# Patient Record
Sex: FEMALE | Race: WHITE | NOT HISPANIC OR LATINO | Employment: OTHER | URBAN - METROPOLITAN AREA
[De-identification: names, ages, dates, MRNs, and addresses within clinical notes are randomized per-mention and may not be internally consistent; named-entity substitution may affect disease eponyms.]

---

## 2019-09-12 DIAGNOSIS — Z12.39 BREAST SCREENING: Primary | ICD-10-CM

## 2019-09-17 ENCOUNTER — HOSPITAL ENCOUNTER (OUTPATIENT)
Dept: RADIOLOGY | Facility: HOSPITAL | Age: 64
Discharge: HOME | End: 2019-09-17
Attending: FAMILY MEDICINE
Payer: MEDICARE

## 2019-09-17 DIAGNOSIS — Z12.39 BREAST SCREENING: ICD-10-CM

## 2019-09-17 PROCEDURE — 77063 MAMMO DIGITAL SCREENING BILAT WITH TOMOSYNTHESIS_CAD: ICD-10-PCS | Mod: 26,,, | Performed by: RADIOLOGY

## 2019-09-17 PROCEDURE — 77067 SCR MAMMO BI INCL CAD: CPT | Mod: TC

## 2019-09-17 PROCEDURE — 77067 MAMMO DIGITAL SCREENING BILAT WITH TOMOSYNTHESIS_CAD: ICD-10-PCS | Mod: 26,,, | Performed by: RADIOLOGY

## 2019-09-17 PROCEDURE — 77063 BREAST TOMOSYNTHESIS BI: CPT | Mod: 26,,, | Performed by: RADIOLOGY

## 2019-09-17 PROCEDURE — 77067 SCR MAMMO BI INCL CAD: CPT | Mod: 26,,, | Performed by: RADIOLOGY

## 2019-09-20 ENCOUNTER — TELEPHONE (OUTPATIENT)
Dept: RADIOLOGY | Facility: HOSPITAL | Age: 64
End: 2019-09-20

## 2020-09-13 PROBLEM — E55.9 VITAMIN D DEFICIENCY: Chronic | Status: ACTIVE | Noted: 2020-09-13

## 2020-09-13 PROBLEM — K63.5 COLON POLYPS: Chronic | Status: ACTIVE | Noted: 2020-09-13

## 2020-09-13 PROBLEM — E21.3 HYPERPARATHYROIDISM: Chronic | Status: ACTIVE | Noted: 2020-09-13

## 2020-09-13 PROBLEM — M81.0 AGE-RELATED OSTEOPOROSIS WITHOUT CURRENT PATHOLOGICAL FRACTURE: Chronic | Status: ACTIVE | Noted: 2020-09-13

## 2020-09-13 PROBLEM — E78.00 HIGH CHOLESTEROL: Chronic | Status: ACTIVE | Noted: 2020-09-13

## 2020-09-13 PROBLEM — R92.8 ABNORMAL MAMMOGRAM: Status: ACTIVE | Noted: 2020-09-13

## 2020-09-21 ENCOUNTER — HOSPITAL ENCOUNTER (OUTPATIENT)
Dept: RADIOLOGY | Facility: HOSPITAL | Age: 65
Discharge: HOME | End: 2020-09-21
Attending: FAMILY MEDICINE
Payer: MEDICARE

## 2020-09-21 DIAGNOSIS — Z12.31 ENCOUNTER FOR SCREENING MAMMOGRAM FOR BREAST CANCER: ICD-10-CM

## 2020-09-21 PROCEDURE — 77063 MAMMO DIGITAL SCREENING BILAT WITH TOMO: ICD-10-PCS | Mod: 26,,, | Performed by: RADIOLOGY

## 2020-09-21 PROCEDURE — 77067 MAMMO DIGITAL SCREENING BILAT WITH TOMO: ICD-10-PCS | Mod: 26,,, | Performed by: RADIOLOGY

## 2020-09-21 PROCEDURE — 77063 BREAST TOMOSYNTHESIS BI: CPT | Mod: 26,,, | Performed by: RADIOLOGY

## 2020-09-21 PROCEDURE — 77067 SCR MAMMO BI INCL CAD: CPT | Mod: TC

## 2020-09-21 PROCEDURE — 77067 SCR MAMMO BI INCL CAD: CPT | Mod: 26,,, | Performed by: RADIOLOGY

## 2021-06-19 ENCOUNTER — HOSPITAL ENCOUNTER (EMERGENCY)
Facility: HOSPITAL | Age: 66
Discharge: HOME | End: 2021-06-19
Attending: EMERGENCY MEDICINE
Payer: MEDICARE

## 2021-06-19 VITALS
TEMPERATURE: 98 F | HEIGHT: 67 IN | DIASTOLIC BLOOD PRESSURE: 72 MMHG | WEIGHT: 218 LBS | SYSTOLIC BLOOD PRESSURE: 120 MMHG | OXYGEN SATURATION: 100 % | RESPIRATION RATE: 16 BRPM | HEART RATE: 93 BPM | BODY MASS INDEX: 34.21 KG/M2

## 2021-06-19 DIAGNOSIS — K46.9 HERNIA OF ABDOMINAL CAVITY: ICD-10-CM

## 2021-06-19 DIAGNOSIS — R10.31 RIGHT LOWER QUADRANT ABDOMINAL PAIN: Primary | ICD-10-CM

## 2021-06-19 DIAGNOSIS — R10.9 ABDOMINAL PAIN: ICD-10-CM

## 2021-06-19 LAB
ALBUMIN SERPL BCP-MCNC: 4.1 G/DL (ref 3.5–5.2)
ALP SERPL-CCNC: 58 U/L (ref 55–135)
ALT SERPL W/O P-5'-P-CCNC: 12 U/L (ref 10–44)
ANION GAP SERPL CALC-SCNC: 14 MMOL/L (ref 8–16)
AST SERPL-CCNC: 16 U/L (ref 10–40)
BASOPHILS # BLD AUTO: 0.01 K/UL (ref 0–0.2)
BASOPHILS NFR BLD: 0.1 % (ref 0–1.9)
BILIRUB SERPL-MCNC: 0.7 MG/DL (ref 0.1–1)
BUN SERPL-MCNC: 17 MG/DL (ref 8–23)
CALCIUM SERPL-MCNC: 10.6 MG/DL (ref 8.7–10.5)
CHLORIDE SERPL-SCNC: 107 MMOL/L (ref 95–110)
CO2 SERPL-SCNC: 18 MMOL/L (ref 23–29)
CREAT SERPL-MCNC: 0.7 MG/DL (ref 0.5–1.4)
DIFFERENTIAL METHOD: ABNORMAL
EOSINOPHIL # BLD AUTO: 0 K/UL (ref 0–0.5)
EOSINOPHIL NFR BLD: 0.1 % (ref 0–8)
ERYTHROCYTE [DISTWIDTH] IN BLOOD BY AUTOMATED COUNT: 12.9 % (ref 11.5–14.5)
EST. GFR  (AFRICAN AMERICAN): >60 ML/MIN/1.73 M^2
EST. GFR  (NON AFRICAN AMERICAN): >60 ML/MIN/1.73 M^2
GLUCOSE SERPL-MCNC: 123 MG/DL (ref 70–110)
HCT VFR BLD AUTO: 40.9 % (ref 37–48.5)
HGB BLD-MCNC: 13.6 G/DL (ref 12–16)
IMM GRANULOCYTES # BLD AUTO: 0.02 K/UL (ref 0–0.04)
IMM GRANULOCYTES NFR BLD AUTO: 0.3 % (ref 0–0.5)
LYMPHOCYTES # BLD AUTO: 1.1 K/UL (ref 1–4.8)
LYMPHOCYTES NFR BLD: 14.1 % (ref 18–48)
MCH RBC QN AUTO: 30.2 PG (ref 27–31)
MCHC RBC AUTO-ENTMCNC: 33.3 G/DL (ref 32–36)
MCV RBC AUTO: 91 FL (ref 82–98)
MONOCYTES # BLD AUTO: 0.5 K/UL (ref 0.3–1)
MONOCYTES NFR BLD: 6.5 % (ref 4–15)
NEUTROPHILS # BLD AUTO: 6.1 K/UL (ref 1.8–7.7)
NEUTROPHILS NFR BLD: 78.9 % (ref 38–73)
NRBC BLD-RTO: 0 /100 WBC
PLATELET # BLD AUTO: 222 K/UL (ref 150–450)
PMV BLD AUTO: 9.6 FL (ref 9.2–12.9)
POTASSIUM SERPL-SCNC: 3.9 MMOL/L (ref 3.5–5.1)
PROT SERPL-MCNC: 7.5 G/DL (ref 6–8.4)
RBC # BLD AUTO: 4.51 M/UL (ref 4–5.4)
SODIUM SERPL-SCNC: 139 MMOL/L (ref 136–145)
WBC # BLD AUTO: 7.73 K/UL (ref 3.9–12.7)

## 2021-06-19 PROCEDURE — 85025 COMPLETE CBC W/AUTO DIFF WBC: CPT | Performed by: EMERGENCY MEDICINE

## 2021-06-19 PROCEDURE — 96374 THER/PROPH/DIAG INJ IV PUSH: CPT

## 2021-06-19 PROCEDURE — 63600175 PHARM REV CODE 636 W HCPCS: Performed by: EMERGENCY MEDICINE

## 2021-06-19 PROCEDURE — 99284 EMERGENCY DEPT VISIT MOD MDM: CPT | Mod: 25

## 2021-06-19 PROCEDURE — 25000003 PHARM REV CODE 250: Performed by: EMERGENCY MEDICINE

## 2021-06-19 PROCEDURE — 80053 COMPREHEN METABOLIC PANEL: CPT | Performed by: EMERGENCY MEDICINE

## 2021-06-19 RX ORDER — METOCLOPRAMIDE 10 MG/1
10 TABLET ORAL EVERY 6 HOURS PRN
Qty: 14 TABLET | Refills: 0 | Status: SHIPPED | OUTPATIENT
Start: 2021-06-19 | End: 2023-09-14

## 2021-06-19 RX ORDER — DICYCLOMINE HYDROCHLORIDE 20 MG/1
20 TABLET ORAL 3 TIMES DAILY PRN
Qty: 14 TABLET | Refills: 0 | Status: SHIPPED | OUTPATIENT
Start: 2021-06-19 | End: 2021-07-19

## 2021-06-19 RX ORDER — METOCLOPRAMIDE HYDROCHLORIDE 5 MG/ML
10 INJECTION INTRAMUSCULAR; INTRAVENOUS
Status: COMPLETED | OUTPATIENT
Start: 2021-06-19 | End: 2021-06-19

## 2021-06-19 RX ADMIN — SODIUM CHLORIDE 1000 ML: 0.9 INJECTION, SOLUTION INTRAVENOUS at 02:06

## 2021-06-19 RX ADMIN — METOCLOPRAMIDE 10 MG: 5 INJECTION, SOLUTION INTRAMUSCULAR; INTRAVENOUS at 02:06

## 2021-06-21 ENCOUNTER — TELEPHONE (OUTPATIENT)
Dept: ADMINISTRATIVE | Facility: OTHER | Age: 66
End: 2021-06-21

## 2021-06-22 ENCOUNTER — TELEPHONE (OUTPATIENT)
Dept: ADMINISTRATIVE | Facility: OTHER | Age: 66
End: 2021-06-22

## 2021-07-01 ENCOUNTER — OFFICE VISIT (OUTPATIENT)
Dept: SURGERY | Facility: CLINIC | Age: 66
End: 2021-07-01
Payer: MEDICARE

## 2021-07-01 VITALS
HEART RATE: 82 BPM | HEIGHT: 67 IN | BODY MASS INDEX: 35.12 KG/M2 | WEIGHT: 223.75 LBS | SYSTOLIC BLOOD PRESSURE: 130 MMHG | DIASTOLIC BLOOD PRESSURE: 78 MMHG

## 2021-07-01 DIAGNOSIS — K40.90 INGUINAL HERNIA OF RIGHT SIDE WITHOUT OBSTRUCTION OR GANGRENE: Primary | ICD-10-CM

## 2021-07-01 DIAGNOSIS — K46.9 HERNIA OF ABDOMINAL CAVITY: ICD-10-CM

## 2021-07-01 PROCEDURE — 99999 PR PBB SHADOW E&M-EST. PATIENT-LVL V: CPT | Mod: PBBFAC,,, | Performed by: STUDENT IN AN ORGANIZED HEALTH CARE EDUCATION/TRAINING PROGRAM

## 2021-07-01 PROCEDURE — 99204 PR OFFICE/OUTPT VISIT, NEW, LEVL IV, 45-59 MIN: ICD-10-PCS | Mod: S$GLB,,, | Performed by: STUDENT IN AN ORGANIZED HEALTH CARE EDUCATION/TRAINING PROGRAM

## 2021-07-01 PROCEDURE — 3288F PR FALLS RISK ASSESSMENT DOCUMENTED: ICD-10-PCS | Mod: CPTII,S$GLB,, | Performed by: STUDENT IN AN ORGANIZED HEALTH CARE EDUCATION/TRAINING PROGRAM

## 2021-07-01 PROCEDURE — 1126F PR PAIN SEVERITY QUANTIFIED, NO PAIN PRESENT: ICD-10-PCS | Mod: S$GLB,,, | Performed by: STUDENT IN AN ORGANIZED HEALTH CARE EDUCATION/TRAINING PROGRAM

## 2021-07-01 PROCEDURE — 3288F FALL RISK ASSESSMENT DOCD: CPT | Mod: CPTII,S$GLB,, | Performed by: STUDENT IN AN ORGANIZED HEALTH CARE EDUCATION/TRAINING PROGRAM

## 2021-07-01 PROCEDURE — 99204 OFFICE O/P NEW MOD 45 MIN: CPT | Mod: S$GLB,,, | Performed by: STUDENT IN AN ORGANIZED HEALTH CARE EDUCATION/TRAINING PROGRAM

## 2021-07-01 PROCEDURE — 99999 PR PBB SHADOW E&M-EST. PATIENT-LVL V: ICD-10-PCS | Mod: PBBFAC,,, | Performed by: STUDENT IN AN ORGANIZED HEALTH CARE EDUCATION/TRAINING PROGRAM

## 2021-07-01 PROCEDURE — 3008F PR BODY MASS INDEX (BMI) DOCUMENTED: ICD-10-PCS | Mod: CPTII,S$GLB,, | Performed by: STUDENT IN AN ORGANIZED HEALTH CARE EDUCATION/TRAINING PROGRAM

## 2021-07-01 PROCEDURE — 1101F PR PT FALLS ASSESS DOC 0-1 FALLS W/OUT INJ PAST YR: ICD-10-PCS | Mod: CPTII,S$GLB,, | Performed by: STUDENT IN AN ORGANIZED HEALTH CARE EDUCATION/TRAINING PROGRAM

## 2021-07-01 PROCEDURE — 1101F PT FALLS ASSESS-DOCD LE1/YR: CPT | Mod: CPTII,S$GLB,, | Performed by: STUDENT IN AN ORGANIZED HEALTH CARE EDUCATION/TRAINING PROGRAM

## 2021-07-01 PROCEDURE — 1159F MED LIST DOCD IN RCRD: CPT | Mod: S$GLB,,, | Performed by: STUDENT IN AN ORGANIZED HEALTH CARE EDUCATION/TRAINING PROGRAM

## 2021-07-01 PROCEDURE — 3008F BODY MASS INDEX DOCD: CPT | Mod: CPTII,S$GLB,, | Performed by: STUDENT IN AN ORGANIZED HEALTH CARE EDUCATION/TRAINING PROGRAM

## 2021-07-01 PROCEDURE — 1159F PR MEDICATION LIST DOCUMENTED IN MEDICAL RECORD: ICD-10-PCS | Mod: S$GLB,,, | Performed by: STUDENT IN AN ORGANIZED HEALTH CARE EDUCATION/TRAINING PROGRAM

## 2021-07-01 PROCEDURE — 1126F AMNT PAIN NOTED NONE PRSNT: CPT | Mod: S$GLB,,, | Performed by: STUDENT IN AN ORGANIZED HEALTH CARE EDUCATION/TRAINING PROGRAM

## 2021-07-01 RX ORDER — LISINOPRIL 10 MG/1
40 TABLET ORAL EVERY MORNING
COMMUNITY
Start: 2021-06-22

## 2021-07-07 ENCOUNTER — ANESTHESIA EVENT (OUTPATIENT)
Dept: SURGERY | Facility: HOSPITAL | Age: 66
End: 2021-07-07
Payer: MEDICARE

## 2021-07-08 ENCOUNTER — HOSPITAL ENCOUNTER (OUTPATIENT)
Facility: HOSPITAL | Age: 66
Discharge: HOME | End: 2021-07-08
Attending: STUDENT IN AN ORGANIZED HEALTH CARE EDUCATION/TRAINING PROGRAM | Admitting: STUDENT IN AN ORGANIZED HEALTH CARE EDUCATION/TRAINING PROGRAM
Payer: MEDICARE

## 2021-07-08 ENCOUNTER — ANESTHESIA (OUTPATIENT)
Dept: SURGERY | Facility: HOSPITAL | Age: 66
End: 2021-07-08
Payer: MEDICARE

## 2021-07-08 VITALS
RESPIRATION RATE: 18 BRPM | TEMPERATURE: 97 F | BODY MASS INDEX: 35 KG/M2 | OXYGEN SATURATION: 97 % | DIASTOLIC BLOOD PRESSURE: 59 MMHG | HEIGHT: 67 IN | HEART RATE: 69 BPM | SYSTOLIC BLOOD PRESSURE: 118 MMHG | WEIGHT: 223 LBS

## 2021-07-08 DIAGNOSIS — K40.90 INGUINAL HERNIA OF RIGHT SIDE WITHOUT OBSTRUCTION OR GANGRENE: ICD-10-CM

## 2021-07-08 DIAGNOSIS — I10 HYPERTENSION: ICD-10-CM

## 2021-07-08 DIAGNOSIS — K40.20 NON-RECURRENT BILATERAL INGUINAL HERNIA WITHOUT OBSTRUCTION OR GANGRENE: Primary | ICD-10-CM

## 2021-07-08 PROCEDURE — 49650 LAP ING HERNIA REPAIR INIT: CPT | Mod: 50,,, | Performed by: STUDENT IN AN ORGANIZED HEALTH CARE EDUCATION/TRAINING PROGRAM

## 2021-07-08 PROCEDURE — 36000710: Performed by: STUDENT IN AN ORGANIZED HEALTH CARE EDUCATION/TRAINING PROGRAM

## 2021-07-08 PROCEDURE — 36000711: Performed by: STUDENT IN AN ORGANIZED HEALTH CARE EDUCATION/TRAINING PROGRAM

## 2021-07-08 PROCEDURE — 71000033 HC RECOVERY, INTIAL HOUR: Performed by: STUDENT IN AN ORGANIZED HEALTH CARE EDUCATION/TRAINING PROGRAM

## 2021-07-08 PROCEDURE — 63600175 PHARM REV CODE 636 W HCPCS: Performed by: NURSE ANESTHETIST, CERTIFIED REGISTERED

## 2021-07-08 PROCEDURE — 37000008 HC ANESTHESIA 1ST 15 MINUTES: Performed by: STUDENT IN AN ORGANIZED HEALTH CARE EDUCATION/TRAINING PROGRAM

## 2021-07-08 PROCEDURE — 49650 PR LAP,INGUINAL HERNIA REPR,INITIAL: ICD-10-PCS | Mod: 50,,, | Performed by: STUDENT IN AN ORGANIZED HEALTH CARE EDUCATION/TRAINING PROGRAM

## 2021-07-08 PROCEDURE — 93010 EKG 12-LEAD: ICD-10-PCS | Mod: ,,, | Performed by: INTERNAL MEDICINE

## 2021-07-08 PROCEDURE — 25000003 PHARM REV CODE 250: Performed by: NURSE ANESTHETIST, CERTIFIED REGISTERED

## 2021-07-08 PROCEDURE — 63600175 PHARM REV CODE 636 W HCPCS: Performed by: ANESTHESIOLOGY

## 2021-07-08 PROCEDURE — C1781 MESH (IMPLANTABLE): HCPCS | Performed by: STUDENT IN AN ORGANIZED HEALTH CARE EDUCATION/TRAINING PROGRAM

## 2021-07-08 PROCEDURE — 63600175 PHARM REV CODE 636 W HCPCS: Performed by: STUDENT IN AN ORGANIZED HEALTH CARE EDUCATION/TRAINING PROGRAM

## 2021-07-08 PROCEDURE — 63600175 PHARM REV CODE 636 W HCPCS

## 2021-07-08 PROCEDURE — 71000016 HC POSTOP RECOV ADDL HR: Performed by: STUDENT IN AN ORGANIZED HEALTH CARE EDUCATION/TRAINING PROGRAM

## 2021-07-08 PROCEDURE — 93005 ELECTROCARDIOGRAM TRACING: CPT | Mod: 59

## 2021-07-08 PROCEDURE — 93010 ELECTROCARDIOGRAM REPORT: CPT | Mod: ,,, | Performed by: INTERNAL MEDICINE

## 2021-07-08 PROCEDURE — 37000009 HC ANESTHESIA EA ADD 15 MINS: Performed by: STUDENT IN AN ORGANIZED HEALTH CARE EDUCATION/TRAINING PROGRAM

## 2021-07-08 PROCEDURE — 71000015 HC POSTOP RECOV 1ST HR: Performed by: STUDENT IN AN ORGANIZED HEALTH CARE EDUCATION/TRAINING PROGRAM

## 2021-07-08 DEVICE — MESH PROGRIP LAP 10X15CM RIGHT: Type: IMPLANTABLE DEVICE | Site: INGUINAL | Status: FUNCTIONAL

## 2021-07-08 DEVICE — MESH PROGRIP LAP 10X15CM LEFT: Type: IMPLANTABLE DEVICE | Site: INGUINAL | Status: FUNCTIONAL

## 2021-07-08 RX ORDER — DEXAMETHASONE SODIUM PHOSPHATE 100 MG/10ML
INJECTION INTRAMUSCULAR; INTRAVENOUS
Status: DISCONTINUED | OUTPATIENT
Start: 2021-07-08 | End: 2021-07-08

## 2021-07-08 RX ORDER — ONDANSETRON 2 MG/ML
INJECTION INTRAMUSCULAR; INTRAVENOUS
Status: DISCONTINUED | OUTPATIENT
Start: 2021-07-08 | End: 2021-07-08

## 2021-07-08 RX ORDER — FENTANYL CITRATE 50 UG/ML
INJECTION, SOLUTION INTRAMUSCULAR; INTRAVENOUS
Status: DISCONTINUED | OUTPATIENT
Start: 2021-07-08 | End: 2021-07-08

## 2021-07-08 RX ORDER — HYDROMORPHONE HYDROCHLORIDE 2 MG/ML
0.5 INJECTION, SOLUTION INTRAMUSCULAR; INTRAVENOUS; SUBCUTANEOUS EVERY 5 MIN PRN
Status: DISCONTINUED | OUTPATIENT
Start: 2021-07-08 | End: 2021-07-08 | Stop reason: HOSPADM

## 2021-07-08 RX ORDER — MIDAZOLAM HYDROCHLORIDE 1 MG/ML
INJECTION INTRAMUSCULAR; INTRAVENOUS
Status: DISCONTINUED | OUTPATIENT
Start: 2021-07-08 | End: 2021-07-08

## 2021-07-08 RX ORDER — AMOXICILLIN AND CLAVULANATE POTASSIUM 500; 125 MG/1; MG/1
1 TABLET, FILM COATED ORAL 3 TIMES DAILY
Qty: 21 TABLET | Refills: 0 | Status: SHIPPED | OUTPATIENT
Start: 2021-07-08 | End: 2021-07-15

## 2021-07-08 RX ORDER — HYDROCODONE BITARTRATE AND ACETAMINOPHEN 5; 325 MG/1; MG/1
1 TABLET ORAL EVERY 4 HOURS PRN
Qty: 10 TABLET | Refills: 0 | Status: SHIPPED | OUTPATIENT
Start: 2021-07-08 | End: 2023-09-14

## 2021-07-08 RX ORDER — HYDROMORPHONE HYDROCHLORIDE 2 MG/ML
INJECTION, SOLUTION INTRAMUSCULAR; INTRAVENOUS; SUBCUTANEOUS
Status: COMPLETED
Start: 2021-07-08 | End: 2021-07-08

## 2021-07-08 RX ORDER — ACETAMINOPHEN 10 MG/ML
INJECTION, SOLUTION INTRAVENOUS
Status: DISCONTINUED | OUTPATIENT
Start: 2021-07-08 | End: 2021-07-08

## 2021-07-08 RX ORDER — SODIUM CHLORIDE, SODIUM LACTATE, POTASSIUM CHLORIDE, CALCIUM CHLORIDE 600; 310; 30; 20 MG/100ML; MG/100ML; MG/100ML; MG/100ML
INJECTION, SOLUTION INTRAVENOUS CONTINUOUS PRN
Status: DISCONTINUED | OUTPATIENT
Start: 2021-07-08 | End: 2021-07-08

## 2021-07-08 RX ORDER — CEFAZOLIN SODIUM 2 G/50ML
2 SOLUTION INTRAVENOUS
Status: COMPLETED | OUTPATIENT
Start: 2021-07-08 | End: 2021-07-08

## 2021-07-08 RX ORDER — BUPIVACAINE HYDROCHLORIDE 5 MG/ML
INJECTION, SOLUTION PERINEURAL
Status: DISCONTINUED | OUTPATIENT
Start: 2021-07-08 | End: 2021-07-08 | Stop reason: HOSPADM

## 2021-07-08 RX ORDER — PROCHLORPERAZINE EDISYLATE 5 MG/ML
5 INJECTION INTRAMUSCULAR; INTRAVENOUS EVERY 30 MIN PRN
Status: DISCONTINUED | OUTPATIENT
Start: 2021-07-08 | End: 2021-07-08 | Stop reason: HOSPADM

## 2021-07-08 RX ORDER — PROPOFOL 10 MG/ML
VIAL (ML) INTRAVENOUS
Status: DISCONTINUED | OUTPATIENT
Start: 2021-07-08 | End: 2021-07-08

## 2021-07-08 RX ORDER — HYDROCODONE BITARTRATE AND ACETAMINOPHEN 5; 325 MG/1; MG/1
1 TABLET ORAL EVERY 4 HOURS PRN
Status: DISCONTINUED | OUTPATIENT
Start: 2021-07-08 | End: 2021-07-08 | Stop reason: HOSPADM

## 2021-07-08 RX ORDER — LIDOCAINE HCL/PF 100 MG/5ML
SYRINGE (ML) INTRAVENOUS
Status: DISCONTINUED | OUTPATIENT
Start: 2021-07-08 | End: 2021-07-08

## 2021-07-08 RX ORDER — KETOROLAC TROMETHAMINE 30 MG/ML
INJECTION, SOLUTION INTRAMUSCULAR; INTRAVENOUS
Status: DISCONTINUED | OUTPATIENT
Start: 2021-07-08 | End: 2021-07-08

## 2021-07-08 RX ORDER — PHENYLEPHRINE HYDROCHLORIDE 10 MG/ML
INJECTION INTRAVENOUS
Status: DISCONTINUED | OUTPATIENT
Start: 2021-07-08 | End: 2021-07-08

## 2021-07-08 RX ORDER — AMOXICILLIN 250 MG
1 CAPSULE ORAL 2 TIMES DAILY
COMMUNITY
Start: 2021-07-08 | End: 2023-09-14

## 2021-07-08 RX ORDER — ROCURONIUM BROMIDE 10 MG/ML
INJECTION, SOLUTION INTRAVENOUS
Status: DISCONTINUED | OUTPATIENT
Start: 2021-07-08 | End: 2021-07-08

## 2021-07-08 RX ADMIN — SUGAMMADEX 200 MG: 100 INJECTION, SOLUTION INTRAVENOUS at 11:07

## 2021-07-08 RX ADMIN — ROCURONIUM BROMIDE 10 MG: 10 INJECTION, SOLUTION INTRAVENOUS at 10:07

## 2021-07-08 RX ADMIN — ROCURONIUM BROMIDE 20 MG: 10 INJECTION, SOLUTION INTRAVENOUS at 10:07

## 2021-07-08 RX ADMIN — MIDAZOLAM HYDROCHLORIDE 2 MG: 1 INJECTION, SOLUTION INTRAMUSCULAR; INTRAVENOUS at 09:07

## 2021-07-08 RX ADMIN — CEFAZOLIN SODIUM 2 G: 2 SOLUTION INTRAVENOUS at 09:07

## 2021-07-08 RX ADMIN — PROCHLORPERAZINE EDISYLATE 5 MG: 5 INJECTION INTRAMUSCULAR; INTRAVENOUS at 01:07

## 2021-07-08 RX ADMIN — KETOROLAC TROMETHAMINE 30 MG: 30 INJECTION, SOLUTION INTRAMUSCULAR; INTRAVENOUS at 11:07

## 2021-07-08 RX ADMIN — HYDROMORPHONE HYDROCHLORIDE 0.5 MG: 2 INJECTION INTRAMUSCULAR; INTRAVENOUS; SUBCUTANEOUS at 11:07

## 2021-07-08 RX ADMIN — ROCURONIUM BROMIDE 50 MG: 10 INJECTION, SOLUTION INTRAVENOUS at 09:07

## 2021-07-08 RX ADMIN — PHENYLEPHRINE HYDROCHLORIDE 100 MCG: 10 INJECTION INTRAVENOUS at 09:07

## 2021-07-08 RX ADMIN — HYDROMORPHONE HYDROCHLORIDE 0.5 MG: 2 INJECTION INTRAMUSCULAR; INTRAVENOUS; SUBCUTANEOUS at 12:07

## 2021-07-08 RX ADMIN — FENTANYL CITRATE 100 MCG: 50 INJECTION, SOLUTION INTRAMUSCULAR; INTRAVENOUS at 09:07

## 2021-07-08 RX ADMIN — PROPOFOL 200 MG: 10 INJECTION, EMULSION INTRAVENOUS at 09:07

## 2021-07-08 RX ADMIN — ACETAMINOPHEN 1000 MG: 10 INJECTION, SOLUTION INTRAVENOUS at 09:07

## 2021-07-08 RX ADMIN — ONDANSETRON 8 MG: 2 INJECTION, SOLUTION INTRAMUSCULAR; INTRAVENOUS at 11:07

## 2021-07-08 RX ADMIN — SODIUM CHLORIDE, SODIUM LACTATE, POTASSIUM CHLORIDE, AND CALCIUM CHLORIDE: .6; .31; .03; .02 INJECTION, SOLUTION INTRAVENOUS at 09:07

## 2021-07-08 RX ADMIN — LIDOCAINE HYDROCHLORIDE 100 MG: 20 INJECTION, SOLUTION INTRAVENOUS at 09:07

## 2021-07-08 RX ADMIN — DEXAMETHASONE SODIUM PHOSPHATE 8 MG: 10 INJECTION, SOLUTION INTRAMUSCULAR; INTRAVENOUS at 09:07

## 2021-07-21 ENCOUNTER — OFFICE VISIT (OUTPATIENT)
Dept: SURGERY | Facility: CLINIC | Age: 66
End: 2021-07-21
Payer: MEDICARE

## 2021-07-21 VITALS
BODY MASS INDEX: 34.66 KG/M2 | HEIGHT: 67 IN | HEART RATE: 81 BPM | TEMPERATURE: 98 F | DIASTOLIC BLOOD PRESSURE: 86 MMHG | WEIGHT: 220.81 LBS | SYSTOLIC BLOOD PRESSURE: 130 MMHG

## 2021-07-21 DIAGNOSIS — Z87.19 S/P HERNIA REPAIR: Primary | ICD-10-CM

## 2021-07-21 DIAGNOSIS — Z98.890 S/P HERNIA REPAIR: Primary | ICD-10-CM

## 2021-07-21 PROCEDURE — 1126F AMNT PAIN NOTED NONE PRSNT: CPT | Mod: CPTII,S$GLB,, | Performed by: STUDENT IN AN ORGANIZED HEALTH CARE EDUCATION/TRAINING PROGRAM

## 2021-07-21 PROCEDURE — 1101F PT FALLS ASSESS-DOCD LE1/YR: CPT | Mod: CPTII,S$GLB,, | Performed by: STUDENT IN AN ORGANIZED HEALTH CARE EDUCATION/TRAINING PROGRAM

## 2021-07-21 PROCEDURE — 1101F PR PT FALLS ASSESS DOC 0-1 FALLS W/OUT INJ PAST YR: ICD-10-PCS | Mod: CPTII,S$GLB,, | Performed by: STUDENT IN AN ORGANIZED HEALTH CARE EDUCATION/TRAINING PROGRAM

## 2021-07-21 PROCEDURE — 99024 PR POST-OP FOLLOW-UP VISIT: ICD-10-PCS | Mod: S$GLB,,, | Performed by: STUDENT IN AN ORGANIZED HEALTH CARE EDUCATION/TRAINING PROGRAM

## 2021-07-21 PROCEDURE — 3008F BODY MASS INDEX DOCD: CPT | Mod: CPTII,S$GLB,, | Performed by: STUDENT IN AN ORGANIZED HEALTH CARE EDUCATION/TRAINING PROGRAM

## 2021-07-21 PROCEDURE — 3079F DIAST BP 80-89 MM HG: CPT | Mod: CPTII,S$GLB,, | Performed by: STUDENT IN AN ORGANIZED HEALTH CARE EDUCATION/TRAINING PROGRAM

## 2021-07-21 PROCEDURE — 1126F PR PAIN SEVERITY QUANTIFIED, NO PAIN PRESENT: ICD-10-PCS | Mod: CPTII,S$GLB,, | Performed by: STUDENT IN AN ORGANIZED HEALTH CARE EDUCATION/TRAINING PROGRAM

## 2021-07-21 PROCEDURE — 3008F PR BODY MASS INDEX (BMI) DOCUMENTED: ICD-10-PCS | Mod: CPTII,S$GLB,, | Performed by: STUDENT IN AN ORGANIZED HEALTH CARE EDUCATION/TRAINING PROGRAM

## 2021-07-21 PROCEDURE — 99999 PR PBB SHADOW E&M-EST. PATIENT-LVL IV: ICD-10-PCS | Mod: PBBFAC,,, | Performed by: STUDENT IN AN ORGANIZED HEALTH CARE EDUCATION/TRAINING PROGRAM

## 2021-07-21 PROCEDURE — 3288F FALL RISK ASSESSMENT DOCD: CPT | Mod: CPTII,S$GLB,, | Performed by: STUDENT IN AN ORGANIZED HEALTH CARE EDUCATION/TRAINING PROGRAM

## 2021-07-21 PROCEDURE — 3075F SYST BP GE 130 - 139MM HG: CPT | Mod: CPTII,S$GLB,, | Performed by: STUDENT IN AN ORGANIZED HEALTH CARE EDUCATION/TRAINING PROGRAM

## 2021-07-21 PROCEDURE — 3075F PR MOST RECENT SYSTOLIC BLOOD PRESS GE 130-139MM HG: ICD-10-PCS | Mod: CPTII,S$GLB,, | Performed by: STUDENT IN AN ORGANIZED HEALTH CARE EDUCATION/TRAINING PROGRAM

## 2021-07-21 PROCEDURE — 3079F PR MOST RECENT DIASTOLIC BLOOD PRESSURE 80-89 MM HG: ICD-10-PCS | Mod: CPTII,S$GLB,, | Performed by: STUDENT IN AN ORGANIZED HEALTH CARE EDUCATION/TRAINING PROGRAM

## 2021-07-21 PROCEDURE — 99999 PR PBB SHADOW E&M-EST. PATIENT-LVL IV: CPT | Mod: PBBFAC,,, | Performed by: STUDENT IN AN ORGANIZED HEALTH CARE EDUCATION/TRAINING PROGRAM

## 2021-07-21 PROCEDURE — 3288F PR FALLS RISK ASSESSMENT DOCUMENTED: ICD-10-PCS | Mod: CPTII,S$GLB,, | Performed by: STUDENT IN AN ORGANIZED HEALTH CARE EDUCATION/TRAINING PROGRAM

## 2021-07-21 PROCEDURE — 99024 POSTOP FOLLOW-UP VISIT: CPT | Mod: S$GLB,,, | Performed by: STUDENT IN AN ORGANIZED HEALTH CARE EDUCATION/TRAINING PROGRAM

## 2021-07-21 RX ORDER — GLUCOSAMINE/CHONDR SU A SOD 750-600 MG
1 TABLET ORAL
COMMUNITY
End: 2023-09-14

## 2023-07-19 ENCOUNTER — TELEPHONE (OUTPATIENT)
Dept: ENDOCRINOLOGY | Facility: CLINIC | Age: 68
End: 2023-07-19
Payer: MEDICARE

## 2023-07-19 NOTE — TELEPHONE ENCOUNTER
----- Message from Ruth Morrison sent at 7/19/2023 10:43 AM CDT -----  Hello,    Patient is being referred for hyperparthyroidism. Referral is scanned in media mgr. Please contact patient to schedule.    Thanks

## 2023-08-10 ENCOUNTER — OFFICE VISIT (OUTPATIENT)
Dept: ENDOCRINOLOGY | Facility: CLINIC | Age: 68
End: 2023-08-10
Payer: MEDICARE

## 2023-08-10 VITALS
HEART RATE: 86 BPM | WEIGHT: 189.81 LBS | HEIGHT: 67 IN | OXYGEN SATURATION: 86 % | SYSTOLIC BLOOD PRESSURE: 119 MMHG | RESPIRATION RATE: 18 BRPM | DIASTOLIC BLOOD PRESSURE: 84 MMHG | BODY MASS INDEX: 29.79 KG/M2

## 2023-08-10 DIAGNOSIS — I10 ESSENTIAL HYPERTENSION: Chronic | ICD-10-CM

## 2023-08-10 DIAGNOSIS — E55.9 VITAMIN D DEFICIENCY: Chronic | ICD-10-CM

## 2023-08-10 DIAGNOSIS — E21.3 HYPERPARATHYROIDISM: ICD-10-CM

## 2023-08-10 DIAGNOSIS — M81.0 AGE-RELATED OSTEOPOROSIS WITHOUT CURRENT PATHOLOGICAL FRACTURE: Chronic | ICD-10-CM

## 2023-08-10 DIAGNOSIS — E66.01 MORBID OBESITY: Primary | ICD-10-CM

## 2023-08-10 PROCEDURE — 99204 PR OFFICE/OUTPT VISIT, NEW, LEVL IV, 45-59 MIN: ICD-10-PCS | Mod: S$GLB,,, | Performed by: INTERNAL MEDICINE

## 2023-08-10 PROCEDURE — 3074F PR MOST RECENT SYSTOLIC BLOOD PRESSURE < 130 MM HG: ICD-10-PCS | Mod: CPTII,S$GLB,, | Performed by: INTERNAL MEDICINE

## 2023-08-10 PROCEDURE — 99999 PR PBB SHADOW E&M-EST. PATIENT-LVL V: ICD-10-PCS | Mod: PBBFAC,,, | Performed by: INTERNAL MEDICINE

## 2023-08-10 PROCEDURE — 1101F PR PT FALLS ASSESS DOC 0-1 FALLS W/OUT INJ PAST YR: ICD-10-PCS | Mod: CPTII,S$GLB,, | Performed by: INTERNAL MEDICINE

## 2023-08-10 PROCEDURE — 3074F SYST BP LT 130 MM HG: CPT | Mod: CPTII,S$GLB,, | Performed by: INTERNAL MEDICINE

## 2023-08-10 PROCEDURE — 1126F PR PAIN SEVERITY QUANTIFIED, NO PAIN PRESENT: ICD-10-PCS | Mod: CPTII,S$GLB,, | Performed by: INTERNAL MEDICINE

## 2023-08-10 PROCEDURE — 3288F FALL RISK ASSESSMENT DOCD: CPT | Mod: CPTII,S$GLB,, | Performed by: INTERNAL MEDICINE

## 2023-08-10 PROCEDURE — 3079F DIAST BP 80-89 MM HG: CPT | Mod: CPTII,S$GLB,, | Performed by: INTERNAL MEDICINE

## 2023-08-10 PROCEDURE — 4010F ACE/ARB THERAPY RXD/TAKEN: CPT | Mod: CPTII,S$GLB,, | Performed by: INTERNAL MEDICINE

## 2023-08-10 PROCEDURE — 3008F PR BODY MASS INDEX (BMI) DOCUMENTED: ICD-10-PCS | Mod: CPTII,S$GLB,, | Performed by: INTERNAL MEDICINE

## 2023-08-10 PROCEDURE — 99204 OFFICE O/P NEW MOD 45 MIN: CPT | Mod: S$GLB,,, | Performed by: INTERNAL MEDICINE

## 2023-08-10 PROCEDURE — 1126F AMNT PAIN NOTED NONE PRSNT: CPT | Mod: CPTII,S$GLB,, | Performed by: INTERNAL MEDICINE

## 2023-08-10 PROCEDURE — 1159F PR MEDICATION LIST DOCUMENTED IN MEDICAL RECORD: ICD-10-PCS | Mod: CPTII,S$GLB,, | Performed by: INTERNAL MEDICINE

## 2023-08-10 PROCEDURE — 3288F PR FALLS RISK ASSESSMENT DOCUMENTED: ICD-10-PCS | Mod: CPTII,S$GLB,, | Performed by: INTERNAL MEDICINE

## 2023-08-10 PROCEDURE — 1101F PT FALLS ASSESS-DOCD LE1/YR: CPT | Mod: CPTII,S$GLB,, | Performed by: INTERNAL MEDICINE

## 2023-08-10 PROCEDURE — 4010F PR ACE/ARB THEARPY RXD/TAKEN: ICD-10-PCS | Mod: CPTII,S$GLB,, | Performed by: INTERNAL MEDICINE

## 2023-08-10 PROCEDURE — 1159F MED LIST DOCD IN RCRD: CPT | Mod: CPTII,S$GLB,, | Performed by: INTERNAL MEDICINE

## 2023-08-10 PROCEDURE — 99999 PR PBB SHADOW E&M-EST. PATIENT-LVL V: CPT | Mod: PBBFAC,,, | Performed by: INTERNAL MEDICINE

## 2023-08-10 PROCEDURE — 3008F BODY MASS INDEX DOCD: CPT | Mod: CPTII,S$GLB,, | Performed by: INTERNAL MEDICINE

## 2023-08-10 PROCEDURE — 3079F PR MOST RECENT DIASTOLIC BLOOD PRESSURE 80-89 MM HG: ICD-10-PCS | Mod: CPTII,S$GLB,, | Performed by: INTERNAL MEDICINE

## 2023-08-10 RX ORDER — CHOLECALCIFEROL (VITAMIN D3) 25 MCG
2000 TABLET ORAL DAILY
COMMUNITY

## 2023-08-10 NOTE — PROGRESS NOTES
ENDOCRINOLOGY INITIAL VISIT  08/10/2023    Reason for Visit:  referred by Samara Arroyo* for evaluation of hypercalcemia and elevated pth    HPI:  Iliana Joy is a 68 y.o. female with hx of hypertension, hyperlipidemia, osteoporosis, obesity, vitD deficiency who presents for evaluation of elevated Ca.  Patient is a retired nurse and excellent historian    Outside records reviewed:  History hypercalcemia with high normal parathyroid hormone on multiple occasions and significantly elevated 24 hour urine CT calcium meeting surgical criteria for history of osteoporosis    Her previous documentation she has been referred to ENT with negative sestamibi scan (06/07/2023).        Labs reviewed:  05/10/2023   Calcium 10.9   Albumin 4.1   PTH 69 (16-77)  Alkaline phosphatase 61 (normal   Twenty-five hydroxy vitamin-D 31 (was on ergo 50K weekly for 3 month(s) prior.  Before this vitD was 27 on over the counter vitamin D 5K IU daily)  24 hour urine creatinine 60 (normal with 3 L collection)  24H urine Ca 411 (high)      With regards to the hypercalcemia :    Was found to have hypercalcemia on routine labs - first noted: 2019 on routine labs (at that time PTH normal)   Pth was checked:  high normal several times in the setting of high Ca and slightly low vitD or normal vitD    Lab Results   Component Value Date    CALCIUM 10.6 (H) 06/19/2021    ALBUMIN 4.1 06/19/2021    ESTGFRAFRICA >60 06/19/2021    EGFRNONAA >60 06/19/2021    ALKPHOS 58 06/19/2021    l    She denied current or past lithium use  Denies HCTZ use.    Parathyroid imaging: sestamibi negative 6/2023  Thyroid ultrasound:  [facility] (followed by ENT, no hx of bxy with plans to repeat ultrasound in 6 month(s))    Daily intake of calcium is: none (stopped in 2019)  Taking vitamin D: Vitamin-D 1000 IU daily     + constipation controled with colace  no depression  +polyuria and polydipsia (drinks at least 3L per day)  + height loss of 2 inches    Denies kidney  stones    Regarding OSTEOPOROSIS:  History of radial fracture in 2019 after which she was started on Fosamax.  Dexa with hip Tscore -2.6 at that time.     Last bmd was:     DEXA 10/14/2022 ([facility])  L-spine T-score -0.7   Right hip T-score-2.4   Left hip T-score -2.2        Review of patient's allergies indicates:  No Known Allergies      Current Outpatient Medications:     acetaminophen (TYLENOL) 500 MG tablet, Take 1,000 mg by mouth., Disp: , Rfl:     alendronate (FOSAMAX) 70 MG tablet, TAKE 1 TABLET BY MOUTH ONCE A WEEK, Disp: 12 tablet, Rfl: 3    ascorbic acid, vitamin C, (VITAMIN C) 100 MG tablet, Take 100 mg by mouth., Disp: , Rfl:     atorvastatin (LIPITOR) 20 MG tablet, TAKE 1 TABLET BY MOUTH ONCE DAILY, Disp: 90 tablet, Rfl: 3    docusate sodium (COLACE) 250 MG capsule, Take 250 mg by mouth., Disp: , Rfl:     lisinopriL 10 MG tablet, Take 10 mg by mouth once daily., Disp: , Rfl:     multivit-min/ferrous fumarate (MULTI VITAMIN ORAL), Take by mouth Daily., Disp: , Rfl:     senna-docusate 8.6-50 mg (PERICOLACE) 8.6-50 mg per tablet, Take 1 tablet by mouth 2 (two) times daily., Disp: , Rfl:     vitamin D (VITAMIN D3) 1000 units Tab, Take 1,000 Units by mouth once daily., Disp: , Rfl:     amLODIPine (NORVASC) 5 MG tablet, TAKE 1 TABLET BY MOUTH ONCE DAILY, Disp: 90 tablet, Rfl: 3    calcium citrate-vitamin D3 250 mg calcium- 200 unit Tab, Take by mouth., Disp: , Rfl:     cyanocobalamin, vitamin B-12, (VITAMIN B-12 ORAL), Take 25 Units by mouth once daily., Disp: , Rfl:     glucosamine/chondr gonzalez A sod (GLUCOSAMINE-CHONDROITIN) 750-600 mg Tab, Take 1 tablet by mouth., Disp: , Rfl:     HYDROcodone-acetaminophen (NORCO) 5-325 mg per tablet, Take 1 tablet by mouth every 4 (four) hours as needed for Pain., Disp: 10 tablet, Rfl: 0    metoclopramide HCl (REGLAN) 10 MG tablet, Take 1 tablet (10 mg total) by mouth every 6 (six) hours as needed (Nausea)., Disp: 14 tablet, Rfl: 0      ROS: see HPI       PHYSICAL EXAM  BP  "119/84 (BP Location: Right arm, Patient Position: Sitting, BP Method: Medium (Manual))   Pulse 86   Resp 18   Ht 5' 7" (1.702 m)   Wt 86.1 kg (189 lb 13.1 oz)   LMP 01/01/2008   SpO2 (!) 86%   BMI 29.73 kg/m²   Wt Readings from Last 3 Encounters:   08/10/23 86.1 kg (189 lb 13.1 oz)   07/21/21 100.1 kg (220 lb 12.7 oz)   07/08/21 101.2 kg (223 lb)   ]    Constitutional:  Pleasant,  in no acute distress.   HENT:   Head:    Normocephalic and atraumatic.   Eyes:     No scleral icterus.   Neck:    + thyromegaly R>L, no cervical LAD  Cardiovascular:  Normal rate, regular rhythm, 2+ radial pulses blx         IMAGING STUDIES    ASSESSMENT/PLAN    Problem List Items Addressed This Visit          1 - High    Hyperparathyroidism (Chronic)     Labs consistent with primary hyperparathyroidism with persistently high serum Ca, one significantly elevated 24H urine Ca and high normal PTH on several occasions in the setting of normal vitD (or slightly low vitD).     Discussed that osteoporosis would be an indication for surgery however this is already being treated with Fosamax.  She has no history of kidney stones but high urine calcium may be another surgical indication.  Will repeat her labs including 24 hour urine calcium along with a CT scan of the neck since her sestamibi scan was negative.      Once these results are available she will see Endocrine Surgery to further discuss the option of undergoing parathyroid surgery.         Relevant Orders    Vitamin D    Renal Function Panel    PTH, Intact    CT Soft Tissue Neck W WO Contrast    Calcium, Timed Urine    Creatinine Clearance, Timed    Ambulatory referral/consult to Endocrine Surgery       2     Essential hypertension (Chronic)     Avoid thiazide diuretics due to hypercalcemia         Osteoporosis (Chronic)     History of fragility fracture in 2019 with DEXA at that time consistent with osteoporosis.  Most recent bone density scan in 2022 was done on a different " machine so direct comparison to previous scan can not be made however her T-scores were in the osteopenia range.  Will continue alendronate for now.    Discussed that osteoporosis may be a result of primary hyperparathyroidism.            3     Vitamin D deficiency (Chronic)     Longstanding history of vitamin-D deficiency with 25 hydroxy vitamin-D level of 27 when she was on over-the-counter vitamin-D 5000 IU daily.  She did improve her vitamin-D level to 30 when on prescription ergocalciferol 74052 IU weekly.  Since May 2023 she has been reduced to over-the-counter vitamin-D 1000 IU daily which I suspect has dropped her levels.  Will check vitamin-D level and adjust dose as needed to keep vitamin-D level above 30.            Unprioritized    RESOLVED: Morbid obesity - Primary     RTC 6 month(s)  Labs in the morning pippa in the next week.       Mela Solomon MD

## 2023-08-10 NOTE — Clinical Note
Patient referred to Dr. Rosen to consider parathyroid surgery for primary hyperparathyroidism.  I have ordered a CT scan of the neck and repeat labs/urine studies.  Can you please assist with getting her scheduled after she has completed her CT scan at least 1 week after she is submitted her urine and had labs?

## 2023-08-10 NOTE — PATIENT INSTRUCTIONS
HOW TO COLLECT AN ACCURATE   24 HOUR URINE SPECIMEN     I want you to collect EVERY drop of your urine during a 24 hour period. I do not care what the volume of the urine is as long as it represents every drop that you pass during that 24 hour period. If you need to have a bowel movement, you may separate the urine but I want every drop.     Begin the collection on a morning which is convenient for you.      The morning you start the urine collection when you wake up in the morning, pee and flush it down the toilet and note the exact time. Now you have an empty bladder and empty bottle. That starts the collection. Collect every drop after that all during the day and night until exactly the same time the next morning, when you should pass your urine and add it to the bottle. Please store the urine container in the fridge or in an ice bucket.    Bring the closed container back to the laboratory with the provided order slip.

## 2023-08-10 NOTE — ASSESSMENT & PLAN NOTE
Labs consistent with primary hyperparathyroidism with persistently high serum Ca, one significantly elevated 24H urine Ca and high normal PTH on several occasions in the setting of normal vitD (or slightly low vitD).     Discussed that osteoporosis would be an indication for surgery however this is already being treated with Fosamax.  She has no history of kidney stones but high urine calcium may be another surgical indication.  Will repeat her labs including 24 hour urine calcium along with a CT scan of the neck since her sestamibi scan was negative.      Once these results are available she will see Endocrine Surgery to further discuss the option of undergoing parathyroid surgery.

## 2023-08-10 NOTE — ASSESSMENT & PLAN NOTE
Longstanding history of vitamin-D deficiency with 25 hydroxy vitamin-D level of 27 when she was on over-the-counter vitamin-D 5000 IU daily.  She did improve her vitamin-D level to 30 when on prescription ergocalciferol 35547 IU weekly.  Since May 2023 she has been reduced to over-the-counter vitamin-D 1000 IU daily which I suspect has dropped her levels.  Will check vitamin-D level and adjust dose as needed to keep vitamin-D level above 30.

## 2023-08-10 NOTE — ASSESSMENT & PLAN NOTE
History of fragility fracture in 2019 with DEXA at that time consistent with osteoporosis.  Most recent bone density scan in 2022 was done on a different machine so direct comparison to previous scan can not be made however her T-scores were in the osteopenia range.  Will continue alendronate for now.    Discussed that osteoporosis may be a result of primary hyperparathyroidism.

## 2023-08-17 ENCOUNTER — LAB VISIT (OUTPATIENT)
Dept: LAB | Facility: HOSPITAL | Age: 68
End: 2023-08-17
Payer: MEDICARE

## 2023-08-17 DIAGNOSIS — E21.3 HYPERPARATHYROIDISM: ICD-10-CM

## 2023-08-17 LAB
25(OH)D3+25(OH)D2 SERPL-MCNC: 27 NG/ML (ref 30–96)
ALBUMIN SERPL BCP-MCNC: 4.2 G/DL (ref 3.5–5.2)
ANION GAP SERPL CALC-SCNC: 9 MMOL/L (ref 8–16)
BUN SERPL-MCNC: 20 MG/DL (ref 8–23)
CALCIUM 24H UR-MRATE: 29 MG/HR (ref 4–12)
CALCIUM SERPL-MCNC: 11.1 MG/DL (ref 8.7–10.5)
CALCIUM UR-MCNC: 18.2 MG/DL (ref 0–15)
CALCIUM URINE (MG/SPEC): 692 MG/SPEC
CHLORIDE SERPL-SCNC: 106 MMOL/L (ref 95–110)
CO2 SERPL-SCNC: 22 MMOL/L (ref 23–29)
CREAT CL/1.73 SQ M 12H UR+SERPL-ARVRAT: 133 ML/MIN (ref 70–110)
CREAT SERPL-MCNC: 0.7 MG/DL (ref 0.5–1.4)
CREAT SERPL-MCNC: 0.7 MG/DL (ref 0.5–1.4)
CREAT UR-MCNC: 35.3 MG/DL (ref 15–325)
CREATININE, URINE (MG/SPEC): 1341.4 MG/SPEC
EST. GFR  (NO RACE VARIABLE): >60 ML/MIN/1.73 M^2
GLUCOSE SERPL-MCNC: 95 MG/DL (ref 70–110)
PHOSPHATE SERPL-MCNC: 2.5 MG/DL (ref 2.7–4.5)
POTASSIUM SERPL-SCNC: 3.8 MMOL/L (ref 3.5–5.1)
PTH-INTACT SERPL-MCNC: 109.7 PG/ML (ref 9–77)
SODIUM SERPL-SCNC: 137 MMOL/L (ref 136–145)
URINE COLLECTION DURATION: 24 HR
URINE COLLECTION DURATION: 24 HR
URINE VOLUME: 3800 ML
URINE VOLUME: 3800 ML

## 2023-08-17 PROCEDURE — 82340 ASSAY OF CALCIUM IN URINE: CPT

## 2023-08-17 PROCEDURE — 83970 ASSAY OF PARATHORMONE: CPT | Performed by: INTERNAL MEDICINE

## 2023-08-17 PROCEDURE — 80069 RENAL FUNCTION PANEL: CPT | Performed by: INTERNAL MEDICINE

## 2023-08-17 PROCEDURE — 36415 COLL VENOUS BLD VENIPUNCTURE: CPT | Performed by: INTERNAL MEDICINE

## 2023-08-17 PROCEDURE — 82306 VITAMIN D 25 HYDROXY: CPT | Performed by: INTERNAL MEDICINE

## 2023-08-17 PROCEDURE — 82575 CREATININE CLEARANCE TEST: CPT | Performed by: INTERNAL MEDICINE

## 2023-08-21 ENCOUNTER — HOSPITAL ENCOUNTER (OUTPATIENT)
Dept: RADIOLOGY | Facility: HOSPITAL | Age: 68
Discharge: HOME | End: 2023-08-21
Attending: INTERNAL MEDICINE
Payer: MEDICARE

## 2023-08-21 DIAGNOSIS — E21.3 HYPERPARATHYROIDISM: ICD-10-CM

## 2023-08-21 PROCEDURE — 70492 CT SFT TSUE NCK W/O & W/DYE: CPT | Mod: 26,,, | Performed by: RADIOLOGY

## 2023-08-21 PROCEDURE — 25500020 PHARM REV CODE 255: Performed by: INTERNAL MEDICINE

## 2023-08-21 PROCEDURE — 70492 CT SOFT TISSUE NECK W WO CONTRAST: ICD-10-PCS | Mod: 26,,, | Performed by: RADIOLOGY

## 2023-08-21 PROCEDURE — 70492 CT SFT TSUE NCK W/O & W/DYE: CPT | Mod: TC

## 2023-08-21 RX ADMIN — IOHEXOL 75 ML: 350 INJECTION, SOLUTION INTRAVENOUS at 10:08

## 2023-08-22 ENCOUNTER — TELEPHONE (OUTPATIENT)
Dept: ENDOCRINOLOGY | Facility: CLINIC | Age: 68
End: 2023-08-22
Payer: MEDICARE

## 2023-08-22 DIAGNOSIS — E04.2 MULTIPLE THYROID NODULES: ICD-10-CM

## 2023-08-22 DIAGNOSIS — E21.3 HYPERPARATHYROIDISM: Primary | ICD-10-CM

## 2023-08-22 NOTE — TELEPHONE ENCOUNTER
----- Message from Mela Solomon MD sent at 8/22/2023 12:37 PM CDT -----  Please schedule neck ultrasound soon department

## 2023-09-11 NOTE — PROGRESS NOTES
Endocrine Surgery History & Physical     REFERRING PROVIDER: Mela Solomon MD    REASON FOR VISIT: Hyperparathyroidism    HPI: Iliana Joy is a 68 y.o. female patient with a history notable for Osteoporosis, HTN, HLD, and vitamin D deficiency who presents in consultation for primary hyperparathyroidism.  Suspicion for hyperparathyroidism was raised on routine laboratory testing to have elevated calcium levels.      Details of the workup are as follows:     Recent laboratory studies:  Hypercalcemia noted since 2019  Max calcium elevation to 11.1 mg/dL  Parathyroid hormone levels Elevated with hypercalcemia  Most recent PTH level was 109.7 with a corresponding calcium of 11.1, albumin 4.1  Phosphorus: 2.5  Vitamin D: 27 in 8/17/23  24 hour urine calcium: 692 mg/24 hours, Benign Familial Hypocalciuric Hypercalcemia unlikely    Medications:  Vitamin D supplementation: 0837-0631 IU daily  Lithium, thiazide diuretics: none  Calcium supplements or calcimimetic: none    Symptoms:   The patient reports the following: []musculoskeletal pain, [x]Fractures (Humerus 2016 and wrist), []nephrolithiasis, []GERD, []peptic ulcer disease, []abdominal pain, []polydipsia, []polyuria, []pruritis  The patient reports the following neurocognitive symptoms: []brain fog, []concentration difficulties, []fatigue, []forgetfulness, []mood/psychiatric disturbances  The patient denies dysphagia, globus sensation, compression symptoms, or anterior neck pain.  The patient denies hoarseness, voice changes or increased need to clear the throat.  Bone mineral density: [x]Osteoporosis []Osteopenia []Normal bone density.  Last DEXA 1/28/2019 with T-score -2.6 of hip    Surgical risk factors:  Risk of concurrent thyroid disease: Present. Multiple nodules on CT up to 2.7 cm on the right  Ultrasound of the thyroid pending--scheduled For Nov 29th   History of neck radiation: none  Prior neck surgery: none  Cardiovascular risk: echocardiogram normal in  2018  Antiplatelet therapy and anticoagulation: Denies    Family history:  No family history of endocrinopathies or endocrine cancers including pituitary tumors, pancreatic neuroendocrine tumors, medullary thyroid cancer or pheochromocytoma/paraganglioma.     Localization studies done prior to this visit:  Ultrasound: Pending, scheduled with Endocrinology  Nuclear Medicine Parathyroid Scan: Sestamibi scan done at outside facility in 6/2023, reported non-localizing  4D-CT Parathyroid scan: No adenoma identified 8/21/2023, scan not done to protocol    LABORATORY STUDIES:  I personally and independently reviewed relevant lab test results, including the following:    Lab Results   Component Value Date    CALCIUM 11.1 (H) 08/17/2023    CALCIUM 10.6 (H) 06/19/2021    CALCIUM 10.7 (H) 09/11/2020    .7 (H) 08/17/2023    ALBUMIN 4.2 08/17/2023    ALBUMIN 4.1 06/19/2021    PHOS 2.5 (L) 08/17/2023    [id number] 27 (L) 08/17/2023       PAST MEDICAL HISTORY:  Patient Active Problem List   Diagnosis    Essential hypertension    Primary osteoarthritis of left knee    High cholesterol    Primary hyperparathyroidism    Osteoporosis    Vitamin D deficiency    Colon polyps    Abnormal mammogram    Inguinal hernia of right side without obstruction or gangrene    Right thyroid nodule         PAST SURGICAL HISTORY:  Past Surgical History:   Procedure Laterality Date    HYSTERECTOMY  2008    OOPHORECTOMY  08/2008    ROBOT-ASSISTED LAPAROSCOPIC REPAIR OF INGUINAL HERNIA Bilateral 7/8/2021    Procedure: ROBOTIC REPAIR, HERNIA, INGUINAL;  Surgeon: Harish Duron MD;  Location: [facility];  Service: General;  Laterality: Bilateral;        MEDICATIONS:  Current Outpatient Medications   Medication Sig Dispense Refill    acetaminophen (TYLENOL) 500 MG tablet Take 1,000 mg by mouth.      alendronate (FOSAMAX) 70 MG tablet TAKE 1 TABLET BY MOUTH ONCE A WEEK 12 tablet 3    ascorbic acid, vitamin C, (VITAMIN C) 100 MG tablet Take 100 mg  "by mouth.      atorvastatin (LIPITOR) 20 MG tablet TAKE 1 TABLET BY MOUTH ONCE DAILY 90 tablet 3    docusate sodium (COLACE) 250 MG capsule Take 250 mg by mouth.      lisinopriL 10 MG tablet Take 40 mg by mouth once daily.      multivit-min/ferrous fumarate (MULTI VITAMIN ORAL) Take by mouth Daily.      vitamin D (VITAMIN D3) 1000 units Tab Take 2,000 Units by mouth once daily.      amLODIPine (NORVASC) 5 MG tablet TAKE 1 TABLET BY MOUTH ONCE DAILY 90 tablet 3    calcium citrate-vitamin D3 250 mg calcium- 200 unit Tab Take by mouth.      cyanocobalamin, vitamin B-12, (VITAMIN B-12 ORAL) Take 25 Units by mouth once daily.      glucosamine/chondr gonzalez A sod (GLUCOSAMINE-CHONDROITIN) 750-600 mg Tab Take 1 tablet by mouth.      HYDROcodone-acetaminophen (NORCO) 5-325 mg per tablet Take 1 tablet by mouth every 4 (four) hours as needed for Pain. 10 tablet 0    metoclopramide HCl (REGLAN) 10 MG tablet Take 1 tablet (10 mg total) by mouth every 6 (six) hours as needed (Nausea). 14 tablet 0    senna-docusate 8.6-50 mg (PERICOLACE) 8.6-50 mg per tablet Take 1 tablet by mouth 2 (two) times daily.       No current facility-administered medications for this visit.       ALLERGIES:  Review of patient's allergies indicates:  No Known Allergies    SOCIAL HISTORY:  Social History     Socioeconomic History    Marital status:    Tobacco Use    Smoking status: Never   Substance and Sexual Activity    Alcohol use: Never         FAMILY HISTORY:  Family History   Problem Relation Age of Onset    Ovarian cancer Sister     Ovarian cancer Maternal Aunt     Cervical cancer Sister         REVIEW OF SYSTEMS:  A detailed review of systems has been reviewed with the patient, pertinent positives and negatives are presented in the note and is otherwise negative.    PHYSICAL EXAMINATION:  Vital Signs: BP (!) 140/74   Pulse 81   Ht 5' 7" (1.702 m)   Wt 85.5 kg (188 lb 6.1 oz)   LMP 01/01/2008   SpO2 100%   BMI 29.50 kg/m² "     Constitutional: well-developed, well-nourished, no acute distress   HENT: No lid lag, no exophthalmos, no scleral icterus, moist mucous membranes, normal dentition  Neck: supple, trachea in midline, thyroid is soft and moves well with swallowing, right thyroid nodule is visible on exam and is easily palpable, good neck extension  Heme/Lymph: no cervical or supraclavicular lymphadenopathy  Respiratory: normal respiratory effort, no wheezes or stridor  Cardiovascular: regular rate and rhythm  Extremities: no edema  Skin: warm and dry, no rashes  Neurologic: no resting tremor of outstretched hands, voice normal  Vascular: radial pulses palpable bilaterally  Psychiatric: affect normal    IMAGING STUDIES:  I personally and independently reviewed, visualized and interpreted the images of the below listed radiology studies (including 4D CT scan from 8/21/23) and my findings are notable for No adenoma identified, but the study stopped at the marco antonio..  Reports below for reference.    CT Soft Tissue Neck W WO Contrast 08/21/2023  Impression  Heterogeneous enlarged thyroid gland likely representing mild goiter formation.  Multiple nodules with the largest measuring 27 mm on the right.  Ultrasound evaluation to be considered.    A discrete parathyroid adenoma is not definitively identified.  Please note imaging was performed to the superior margin of the aortic arch and not to the marco antonio.  If clinically warranted, the patient can be brought back for additional imaging into the mediastinum.    Electronically signed by: Lonnie Levy  Date:    08/21/2023  Time:    15:18    IMPRESSION:  I had the pleasure of seeing Ms. Joy in Endocrine Surgical consultation regarding the biochemical diagnosis of primary hyperparathyroidism.     We discussed that hyperparathyroidism can compromise bone and cardiovascular health. In addition to classic symptoms such as kidney stones and bone loss, hyperparathyroidism can be associated with  multiple symptoms including fatigue, depression, memory loss, pruritis, polyuria, nocturia, constipation, polydipsia, and musculoskeletal aches and pains. Hyperparathyroidism can also worsen hypertension.  I discussed the implications of non-operative observation as well as the standard of care surgical treatment of primary hyperparathyroidism and my recommendation for parathyroidectomy pending further localization and evaluation of her right thyroid nodule with ultrasound and possible FNA.      We extensively discussed the pros and cons for surgical intervention, in this case parathyroidectomy with intraoperative parathyroid hormone monitoring.  We discussed that in the majority of cases, a single adenoma is the culprit gland. However, multigland disease is possible and multigland disease has a lower likelihood of radiographic localization.  Parathyroidectomy for single gland disease is a same day surgery while four-gland parathyroid exploration may require an overnight stay. We discussed that in all cases, parathyroidectomy has an attendant risk of postoperative hypocalcemia which can be mitigated with calcium and vitamin D supplementation. Other possible complications associated with this may include, but may not be restricted to hoarseness, recurrent laryngeal nerve injury - temporary or permanent, superior laryngeal nerve injury - temporary or permanent, hypocalcemia and hypoparathyroidism - temporary or permanent, neck hematoma, bleeding, infection, scarring, wound healing problems and possible death. We discussed that in rare cases, parathyroid exploration may be negative. Recurrent and persistent disease are also possible.      All questions were answered and the patient expressed understanding of all the risks, benefits and alternatives, and agreed to proceed with the plan despite the risks.  Surgical consent was signed and witnessed.    Problem List Items Addressed This Visit          Endocrine     Osteoporosis (Chronic)     Indication for parathyroidectomy. See Hyperparathyroidism.         Vitamin D deficiency (Chronic)     Longstanding history of vitamin D deficiency. Last level was 27 in 8/2023.    - Continue supplementation, defer to Endocrinology to adjust between ergocalciferol or cholecalciferol          Primary hyperparathyroidism - Primary     69 yo F with osteoporosis and a 2.7 cm right thyroid nodule presenting for evaluation of her hyperparathyroidism. Workup consistent with symptomatic primary hyperparathyroidism and she has osteoporosis with fractures and hypercalciuria, therefore meets criteria for parathyroid surgery.  Localization studies are incomplete and we need further evaluation of her right thyroid nodule to ensure concurrent thyroid surgery is not indicated.     - OR for parathyroidectomy with intraoperative PTH monitoring  - Formal thyroid ultrasound to evaluate her concurrent thyroid pathology. Possible FNA if indicated, currently scheduled with Endocrinology  - Her 4D CT neck was an incomplete study.  Will contact the radiology department to have this study repeated with the appropriate protocol  - Patient previously had a sestamibi scan, will have the patient bring in the disk and report for review  - The patient has been advised to stay hydrated and avoid the use of calcium supplements.           Right thyroid nodule     2.7 cm right thyroid nodule.  Previously followed.     - Ultrasound as planned, FNA if indicated           Patient was seen and evaluated with surgery resident Yemi Burroughs MD.    Kathryn Rosen MD  Staff Surgeon  Endocrine Surgery  9/14/23

## 2023-09-14 ENCOUNTER — OFFICE VISIT (OUTPATIENT)
Dept: SURGERY | Facility: CLINIC | Age: 68
End: 2023-09-14
Payer: MEDICARE

## 2023-09-14 VITALS
HEART RATE: 81 BPM | SYSTOLIC BLOOD PRESSURE: 140 MMHG | BODY MASS INDEX: 29.57 KG/M2 | WEIGHT: 188.38 LBS | OXYGEN SATURATION: 100 % | DIASTOLIC BLOOD PRESSURE: 74 MMHG | HEIGHT: 67 IN

## 2023-09-14 DIAGNOSIS — E21.0 PRIMARY HYPERPARATHYROIDISM: Primary | ICD-10-CM

## 2023-09-14 DIAGNOSIS — E55.9 VITAMIN D DEFICIENCY: Chronic | ICD-10-CM

## 2023-09-14 DIAGNOSIS — E04.1 RIGHT THYROID NODULE: ICD-10-CM

## 2023-09-14 DIAGNOSIS — E21.3 HYPERPARATHYROIDISM: ICD-10-CM

## 2023-09-14 DIAGNOSIS — M81.0 AGE-RELATED OSTEOPOROSIS WITHOUT CURRENT PATHOLOGICAL FRACTURE: Chronic | ICD-10-CM

## 2023-09-14 PROCEDURE — 99215 PR OFFICE/OUTPT VISIT, EST, LEVL V, 40-54 MIN: ICD-10-PCS | Mod: S$GLB,,, | Performed by: STUDENT IN AN ORGANIZED HEALTH CARE EDUCATION/TRAINING PROGRAM

## 2023-09-14 PROCEDURE — 3078F DIAST BP <80 MM HG: CPT | Mod: CPTII,S$GLB,, | Performed by: STUDENT IN AN ORGANIZED HEALTH CARE EDUCATION/TRAINING PROGRAM

## 2023-09-14 PROCEDURE — 99215 OFFICE O/P EST HI 40 MIN: CPT | Mod: S$GLB,,, | Performed by: STUDENT IN AN ORGANIZED HEALTH CARE EDUCATION/TRAINING PROGRAM

## 2023-09-14 PROCEDURE — 3008F BODY MASS INDEX DOCD: CPT | Mod: CPTII,S$GLB,, | Performed by: STUDENT IN AN ORGANIZED HEALTH CARE EDUCATION/TRAINING PROGRAM

## 2023-09-14 PROCEDURE — 3078F PR MOST RECENT DIASTOLIC BLOOD PRESSURE < 80 MM HG: ICD-10-PCS | Mod: CPTII,S$GLB,, | Performed by: STUDENT IN AN ORGANIZED HEALTH CARE EDUCATION/TRAINING PROGRAM

## 2023-09-14 PROCEDURE — 1101F PT FALLS ASSESS-DOCD LE1/YR: CPT | Mod: CPTII,S$GLB,, | Performed by: STUDENT IN AN ORGANIZED HEALTH CARE EDUCATION/TRAINING PROGRAM

## 2023-09-14 PROCEDURE — 1101F PR PT FALLS ASSESS DOC 0-1 FALLS W/OUT INJ PAST YR: ICD-10-PCS | Mod: CPTII,S$GLB,, | Performed by: STUDENT IN AN ORGANIZED HEALTH CARE EDUCATION/TRAINING PROGRAM

## 2023-09-14 PROCEDURE — 99999 PR PBB SHADOW E&M-EST. PATIENT-LVL V: CPT | Mod: PBBFAC,,, | Performed by: STUDENT IN AN ORGANIZED HEALTH CARE EDUCATION/TRAINING PROGRAM

## 2023-09-14 PROCEDURE — 1159F PR MEDICATION LIST DOCUMENTED IN MEDICAL RECORD: ICD-10-PCS | Mod: CPTII,S$GLB,, | Performed by: STUDENT IN AN ORGANIZED HEALTH CARE EDUCATION/TRAINING PROGRAM

## 2023-09-14 PROCEDURE — 99999 PR PBB SHADOW E&M-EST. PATIENT-LVL V: ICD-10-PCS | Mod: PBBFAC,,, | Performed by: STUDENT IN AN ORGANIZED HEALTH CARE EDUCATION/TRAINING PROGRAM

## 2023-09-14 PROCEDURE — 3077F PR MOST RECENT SYSTOLIC BLOOD PRESSURE >= 140 MM HG: ICD-10-PCS | Mod: CPTII,S$GLB,, | Performed by: STUDENT IN AN ORGANIZED HEALTH CARE EDUCATION/TRAINING PROGRAM

## 2023-09-14 PROCEDURE — 3288F FALL RISK ASSESSMENT DOCD: CPT | Mod: CPTII,S$GLB,, | Performed by: STUDENT IN AN ORGANIZED HEALTH CARE EDUCATION/TRAINING PROGRAM

## 2023-09-14 PROCEDURE — 3077F SYST BP >= 140 MM HG: CPT | Mod: CPTII,S$GLB,, | Performed by: STUDENT IN AN ORGANIZED HEALTH CARE EDUCATION/TRAINING PROGRAM

## 2023-09-14 PROCEDURE — 3008F PR BODY MASS INDEX (BMI) DOCUMENTED: ICD-10-PCS | Mod: CPTII,S$GLB,, | Performed by: STUDENT IN AN ORGANIZED HEALTH CARE EDUCATION/TRAINING PROGRAM

## 2023-09-14 PROCEDURE — 4010F ACE/ARB THERAPY RXD/TAKEN: CPT | Mod: CPTII,S$GLB,, | Performed by: STUDENT IN AN ORGANIZED HEALTH CARE EDUCATION/TRAINING PROGRAM

## 2023-09-14 PROCEDURE — 1126F PR PAIN SEVERITY QUANTIFIED, NO PAIN PRESENT: ICD-10-PCS | Mod: CPTII,S$GLB,, | Performed by: STUDENT IN AN ORGANIZED HEALTH CARE EDUCATION/TRAINING PROGRAM

## 2023-09-14 PROCEDURE — 1159F MED LIST DOCD IN RCRD: CPT | Mod: CPTII,S$GLB,, | Performed by: STUDENT IN AN ORGANIZED HEALTH CARE EDUCATION/TRAINING PROGRAM

## 2023-09-14 PROCEDURE — 3288F PR FALLS RISK ASSESSMENT DOCUMENTED: ICD-10-PCS | Mod: CPTII,S$GLB,, | Performed by: STUDENT IN AN ORGANIZED HEALTH CARE EDUCATION/TRAINING PROGRAM

## 2023-09-14 PROCEDURE — 4010F PR ACE/ARB THEARPY RXD/TAKEN: ICD-10-PCS | Mod: CPTII,S$GLB,, | Performed by: STUDENT IN AN ORGANIZED HEALTH CARE EDUCATION/TRAINING PROGRAM

## 2023-09-14 PROCEDURE — 1126F AMNT PAIN NOTED NONE PRSNT: CPT | Mod: CPTII,S$GLB,, | Performed by: STUDENT IN AN ORGANIZED HEALTH CARE EDUCATION/TRAINING PROGRAM

## 2023-09-14 NOTE — ASSESSMENT & PLAN NOTE
Longstanding history of vitamin D deficiency. Last level was 27 in 8/2023.    - Continue supplementation, defer to Endocrinology to adjust between ergocalciferol or cholecalciferol

## 2023-09-14 NOTE — ASSESSMENT & PLAN NOTE
67 yo F with osteoporosis and a 2.7 cm right thyroid nodule presenting for evaluation of her hyperparathyroidism. Workup consistent with symptomatic primary hyperparathyroidism and she has osteoporosis with fractures and hypercalciuria, therefore meets criteria for parathyroid surgery.  Localization studies are incomplete and we need further evaluation of her right thyroid nodule to ensure concurrent thyroid surgery is not indicated.     - OR for parathyroidectomy with intraoperative PTH monitoring  - Formal thyroid ultrasound to evaluate her concurrent thyroid pathology. Possible FNA if indicated, currently scheduled with Endocrinology  - Her 4D CT neck was an incomplete study.  Will contact the radiology department to have this study repeated with the appropriate protocol  - Patient previously had a sestamibi scan, will have the patient bring in the disk and report for review  - The patient has been advised to stay hydrated and avoid the use of calcium supplements.

## 2023-10-11 ENCOUNTER — PATIENT MESSAGE (OUTPATIENT)
Dept: ENDOCRINOLOGY | Facility: CLINIC | Age: 68
End: 2023-10-11
Payer: MEDICARE

## 2023-10-11 ENCOUNTER — PATIENT MESSAGE (OUTPATIENT)
Dept: SURGERY | Facility: CLINIC | Age: 68
End: 2023-10-11
Payer: MEDICARE

## 2023-10-11 ENCOUNTER — TELEPHONE (OUTPATIENT)
Dept: SURGERY | Facility: CLINIC | Age: 68
End: 2023-10-11
Payer: MEDICARE

## 2023-10-11 DIAGNOSIS — E21.0 PRIMARY HYPERPARATHYROIDISM: Primary | ICD-10-CM

## 2023-10-11 NOTE — TELEPHONE ENCOUNTER
Left message on patient's voicemail and My Chart for her to call and scheduled the repeat 4D Parathyroid CT Scan (at no charge to her).  Also, notified her that the images of her NM Parathyroid Scan and Neck US from [facility] have been received and will be uploaded into her chart.

## 2023-10-13 ENCOUNTER — PATIENT MESSAGE (OUTPATIENT)
Dept: SURGERY | Facility: CLINIC | Age: 68
End: 2023-10-13
Payer: MEDICARE

## 2023-10-21 ENCOUNTER — HOSPITAL ENCOUNTER (OUTPATIENT)
Dept: RADIOLOGY | Facility: HOSPITAL | Age: 68
Discharge: HOME | End: 2023-10-21
Attending: STUDENT IN AN ORGANIZED HEALTH CARE EDUCATION/TRAINING PROGRAM
Payer: MEDICARE

## 2023-10-21 DIAGNOSIS — E21.0 PRIMARY HYPERPARATHYROIDISM: ICD-10-CM

## 2023-10-21 LAB
CREAT SERPL-MCNC: 0.7 MG/DL (ref 0.5–1.4)
SAMPLE: NORMAL

## 2023-10-21 PROCEDURE — 70492 CT SFT TSUE NCK W/O & W/DYE: CPT | Mod: 26,,, | Performed by: RADIOLOGY

## 2023-10-21 PROCEDURE — 70492 CT SFT TSUE NCK W/O & W/DYE: CPT | Mod: TC

## 2023-10-21 PROCEDURE — 70492 CT NECK PARATHYROID (4D): ICD-10-PCS | Mod: 26,,, | Performed by: RADIOLOGY

## 2023-10-21 PROCEDURE — 25500020 PHARM REV CODE 255: Performed by: STUDENT IN AN ORGANIZED HEALTH CARE EDUCATION/TRAINING PROGRAM

## 2023-10-21 RX ADMIN — IOHEXOL 100 ML: 350 INJECTION, SOLUTION INTRAVENOUS at 09:10

## 2023-11-16 ENCOUNTER — PATIENT MESSAGE (OUTPATIENT)
Dept: SURGERY | Facility: CLINIC | Age: 68
End: 2023-11-16
Payer: MEDICARE

## 2023-11-29 ENCOUNTER — HOSPITAL ENCOUNTER (OUTPATIENT)
Dept: ENDOCRINOLOGY | Facility: CLINIC | Age: 68
Discharge: HOME | End: 2023-11-29
Attending: INTERNAL MEDICINE
Payer: MEDICARE

## 2023-11-29 DIAGNOSIS — E04.2 MULTIPLE THYROID NODULES: ICD-10-CM

## 2023-11-29 DIAGNOSIS — E21.3 HYPERPARATHYROIDISM: ICD-10-CM

## 2023-11-29 PROCEDURE — 76536 US EXAM OF HEAD AND NECK: CPT | Mod: S$GLB,,, | Performed by: INTERNAL MEDICINE

## 2023-11-29 PROCEDURE — 76536 US SOFT TISSUE HEAD NECK THYROID: ICD-10-PCS | Mod: S$GLB,,, | Performed by: INTERNAL MEDICINE

## 2023-12-01 ENCOUNTER — LAB VISIT (OUTPATIENT)
Dept: LAB | Facility: HOSPITAL | Age: 68
End: 2023-12-01
Attending: STUDENT IN AN ORGANIZED HEALTH CARE EDUCATION/TRAINING PROGRAM
Payer: MEDICARE

## 2023-12-01 DIAGNOSIS — E21.0 PRIMARY HYPERPARATHYROIDISM: ICD-10-CM

## 2023-12-01 LAB
ALBUMIN SERPL BCP-MCNC: 4.1 G/DL (ref 3.5–5.2)
ALP SERPL-CCNC: 65 U/L (ref 55–135)
ALT SERPL W/O P-5'-P-CCNC: 17 U/L (ref 10–44)
ANION GAP SERPL CALC-SCNC: 8 MMOL/L (ref 8–16)
AST SERPL-CCNC: 19 U/L (ref 10–40)
BASOPHILS # BLD AUTO: 0.02 K/UL (ref 0–0.2)
BASOPHILS NFR BLD: 0.4 % (ref 0–1.9)
BILIRUB SERPL-MCNC: 0.6 MG/DL (ref 0.1–1)
BUN SERPL-MCNC: 18 MG/DL (ref 8–23)
CALCIUM SERPL-MCNC: 11 MG/DL (ref 8.7–10.5)
CHLORIDE SERPL-SCNC: 107 MMOL/L (ref 95–110)
CO2 SERPL-SCNC: 26 MMOL/L (ref 23–29)
CREAT SERPL-MCNC: 0.7 MG/DL (ref 0.5–1.4)
DIFFERENTIAL METHOD: ABNORMAL
EOSINOPHIL # BLD AUTO: 0 K/UL (ref 0–0.5)
EOSINOPHIL NFR BLD: 0.4 % (ref 0–8)
ERYTHROCYTE [DISTWIDTH] IN BLOOD BY AUTOMATED COUNT: 12.4 % (ref 11.5–14.5)
EST. GFR  (NO RACE VARIABLE): >60 ML/MIN/1.73 M^2
GLUCOSE SERPL-MCNC: 89 MG/DL (ref 70–110)
HCT VFR BLD AUTO: 38.3 % (ref 37–48.5)
HGB BLD-MCNC: 12.9 G/DL (ref 12–16)
IMM GRANULOCYTES # BLD AUTO: 0.01 K/UL (ref 0–0.04)
IMM GRANULOCYTES NFR BLD AUTO: 0.2 % (ref 0–0.5)
LYMPHOCYTES # BLD AUTO: 1.6 K/UL (ref 1–4.8)
LYMPHOCYTES NFR BLD: 28.3 % (ref 18–48)
MCH RBC QN AUTO: 31.6 PG (ref 27–31)
MCHC RBC AUTO-ENTMCNC: 33.7 G/DL (ref 32–36)
MCV RBC AUTO: 94 FL (ref 82–98)
MONOCYTES # BLD AUTO: 0.5 K/UL (ref 0.3–1)
MONOCYTES NFR BLD: 8.1 % (ref 4–15)
NEUTROPHILS # BLD AUTO: 3.5 K/UL (ref 1.8–7.7)
NEUTROPHILS NFR BLD: 62.6 % (ref 38–73)
NRBC BLD-RTO: 0 /100 WBC
PLATELET # BLD AUTO: 188 K/UL (ref 150–450)
PMV BLD AUTO: 9.1 FL (ref 9.2–12.9)
POTASSIUM SERPL-SCNC: 4.1 MMOL/L (ref 3.5–5.1)
PROT SERPL-MCNC: 7 G/DL (ref 6–8.4)
RBC # BLD AUTO: 4.08 M/UL (ref 4–5.4)
SODIUM SERPL-SCNC: 141 MMOL/L (ref 136–145)
WBC # BLD AUTO: 5.59 K/UL (ref 3.9–12.7)

## 2023-12-01 PROCEDURE — 80053 COMPREHEN METABOLIC PANEL: CPT | Performed by: STUDENT IN AN ORGANIZED HEALTH CARE EDUCATION/TRAINING PROGRAM

## 2023-12-01 PROCEDURE — 36415 COLL VENOUS BLD VENIPUNCTURE: CPT | Mod: PO | Performed by: STUDENT IN AN ORGANIZED HEALTH CARE EDUCATION/TRAINING PROGRAM

## 2023-12-01 PROCEDURE — 85025 COMPLETE CBC W/AUTO DIFF WBC: CPT | Performed by: STUDENT IN AN ORGANIZED HEALTH CARE EDUCATION/TRAINING PROGRAM

## 2023-12-07 ENCOUNTER — PATIENT MESSAGE (OUTPATIENT)
Dept: SURGERY | Facility: HOSPITAL | Age: 68
End: 2023-12-07
Payer: MEDICARE

## 2023-12-07 DIAGNOSIS — E04.2 MULTINODULAR THYROID: Primary | ICD-10-CM

## 2023-12-12 ENCOUNTER — TELEPHONE (OUTPATIENT)
Dept: INTERVENTIONAL RADIOLOGY/VASCULAR | Facility: HOSPITAL | Age: 68
End: 2023-12-12
Payer: MEDICARE

## 2023-12-12 NOTE — NURSING
Patient advised to arrive at 2:30 pm, where to check in, no need to fast, may drive self. Take medications as usual, bring current list of home meds. Confirmed no allergies and no blood thinners.

## 2023-12-13 ENCOUNTER — HOSPITAL ENCOUNTER (OUTPATIENT)
Dept: INTERVENTIONAL RADIOLOGY/VASCULAR | Facility: HOSPITAL | Age: 68
Discharge: HOME | End: 2023-12-13
Attending: STUDENT IN AN ORGANIZED HEALTH CARE EDUCATION/TRAINING PROGRAM
Payer: MEDICARE

## 2023-12-13 VITALS
RESPIRATION RATE: 16 BRPM | HEART RATE: 72 BPM | TEMPERATURE: 98 F | HEIGHT: 67 IN | DIASTOLIC BLOOD PRESSURE: 68 MMHG | OXYGEN SATURATION: 100 % | SYSTOLIC BLOOD PRESSURE: 127 MMHG | WEIGHT: 187 LBS | BODY MASS INDEX: 29.35 KG/M2

## 2023-12-13 DIAGNOSIS — E04.2 MULTIPLE THYROID NODULES: Primary | ICD-10-CM

## 2023-12-13 DIAGNOSIS — E04.2 MULTINODULAR THYROID: ICD-10-CM

## 2023-12-13 DIAGNOSIS — E04.1 THYROID NODULE: ICD-10-CM

## 2023-12-13 PROCEDURE — 88172 PR  EVALUATION OF FNA SMEAR TO DETERMINE ADEQUACY, FIRST EVAL: ICD-10-PCS | Mod: 26,,, | Performed by: PATHOLOGY

## 2023-12-13 PROCEDURE — 88177 PR  EVALUATION OF FNA SMEAR TO DETERMINE ADEQUACY, EA ADD EVAL: ICD-10-PCS | Mod: 26,,, | Performed by: PATHOLOGY

## 2023-12-13 PROCEDURE — 25000003 PHARM REV CODE 250: Performed by: PHYSICIAN ASSISTANT

## 2023-12-13 PROCEDURE — 10005 FNA BX W/US GDN 1ST LES: CPT | Mod: RT,,, | Performed by: PHYSICIAN ASSISTANT

## 2023-12-13 PROCEDURE — 10006 FNA BX W/US GDN EA ADDL: CPT | Performed by: RADIOLOGY

## 2023-12-13 PROCEDURE — 88177 CYTP FNA EVAL EA ADDL: CPT | Mod: 59 | Performed by: PATHOLOGY

## 2023-12-13 PROCEDURE — 88173 CYTOPATH EVAL FNA REPORT: CPT | Performed by: PATHOLOGY

## 2023-12-13 PROCEDURE — 10005 FNA BX W/US GDN 1ST LES: CPT | Performed by: RADIOLOGY

## 2023-12-13 PROCEDURE — 88172 CYTP DX EVAL FNA 1ST EA SITE: CPT | Mod: 59 | Performed by: PATHOLOGY

## 2023-12-13 PROCEDURE — 88172 CYTP DX EVAL FNA 1ST EA SITE: CPT | Mod: 26,,, | Performed by: PATHOLOGY

## 2023-12-13 PROCEDURE — 88177 CYTP FNA EVAL EA ADDL: CPT | Mod: 26,,, | Performed by: PATHOLOGY

## 2023-12-13 PROCEDURE — 10005 IR US FINE NEEDLE ASPIRATION BIOPSY, FIRST LESION: ICD-10-PCS | Mod: RT,,, | Performed by: PHYSICIAN ASSISTANT

## 2023-12-13 PROCEDURE — 10006 FNA BX W/US GDN EA ADDL: CPT | Mod: RT,,, | Performed by: PHYSICIAN ASSISTANT

## 2023-12-13 PROCEDURE — 88173 PR  INTERPRETATION OF FNA SMEAR: ICD-10-PCS | Mod: 26,,, | Performed by: PATHOLOGY

## 2023-12-13 PROCEDURE — 10006 PR FINE NEEDLE ASP BIOPSY, W/US GUIDANCE, EA ADDTL LESION: ICD-10-PCS | Mod: RT,,, | Performed by: PHYSICIAN ASSISTANT

## 2023-12-13 PROCEDURE — 88173 CYTOPATH EVAL FNA REPORT: CPT | Mod: 26,,, | Performed by: PATHOLOGY

## 2023-12-13 RX ORDER — LIDOCAINE HYDROCHLORIDE 10 MG/ML
INJECTION INFILTRATION; PERINEURAL
Status: COMPLETED | OUTPATIENT
Start: 2023-12-13 | End: 2023-12-13

## 2023-12-13 RX ADMIN — LIDOCAINE HYDROCHLORIDE 5 ML: 10 INJECTION, SOLUTION INFILTRATION; PERINEURAL at 03:12

## 2023-12-13 NOTE — NURSING
Patient is discharged from IR. AVS is printed and reviewed. Upcoming appointment and the [facility] number is highlighted for convenience. The opportunity to ask questions is provided. Patient is ambulatory from the unit and directed to the front lobby to exit.

## 2023-12-13 NOTE — SEDATION DOCUMENTATION
IR procedure - Thyroid FNA - is complete. Patient tolerated well. She is awake, alert, and oriented. Vital signs are stable. Pathology is present to ensure the adequacy of samples and to take custody of the specimens. Patient will return to IR pre/post to complete discharge.

## 2023-12-13 NOTE — H&P
Interventional Radiology Pre-Procedure History & Physical      Chief Complaint/Reason for Referral: thyroid nodules    History of Present Illness:  Iliana Joy is a 68 y.o. female who presents for FNA of two thyroid nodules    Past Medical History:   Diagnosis Date    Hypertension      Past Surgical History:   Procedure Laterality Date    HYSTERECTOMY  2008    OOPHORECTOMY  08/2008    ROBOT-ASSISTED LAPAROSCOPIC REPAIR OF INGUINAL HERNIA Bilateral 7/8/2021    Procedure: ROBOTIC REPAIR, HERNIA, INGUINAL;  Surgeon: Harish Duron MD;  Location: [facility];  Service: General;  Laterality: Bilateral;       Allergies:   Review of patient's allergies indicates:  No Known Allergies    Home Meds:   Prior to Admission medications    Medication Sig Start Date End Date Taking? Authorizing Provider   acetaminophen (TYLENOL) 500 MG tablet Take 1,000 mg by mouth.    Provider, Historical   alendronate (FOSAMAX) 70 MG tablet TAKE 1 TABLET BY MOUTH ONCE A WEEK 11/23/20   Ed Kirby MD   ascorbic acid, vitamin C, (VITAMIN C) 100 MG tablet Take 100 mg by mouth.    Provider, Historical   atorvastatin (LIPITOR) 20 MG tablet TAKE 1 TABLET BY MOUTH ONCE DAILY 2/1/21   Ed Kirby MD   docusate sodium (COLACE) 250 MG capsule Take 250 mg by mouth.    Provider, Historical   lisinopriL 10 MG tablet Take 40 mg by mouth once daily. 6/22/21   Provider, Historical   multivit-min/ferrous fumarate (MULTI VITAMIN ORAL) Take by mouth Daily.    Provider, Historical   vitamin D (VITAMIN D3) 1000 units Tab Take 2,000 Units by mouth once daily.    Provider, Historical       Anticoagulation/Antiplatelet Meds: no anticoagulation    Review of Systems:   Hematological: no known coagulopathies  Respiratory: no shortness of breath  Cardiovascular: no chest pain  Gastrointestinal: no abdominal pain  Genitourinary: no dysuria  Musculoskeletal: negative  Neurological: no TIA or stroke symptoms     Physical Exam:       General: WNWD,  "NAD  HEENT: Normocephalic, sclera anicteric  Neck: Supple  Heart: RRR by pulse  Lungs: Symmetric excursions, breathing unlabored  Abd: Nondistended  Extremities: MAEW  Neuro: AA x 3    Laboratory:  No results found for: "INR", "PT", "PTT"    Lab Results   Component Value Date    WBC 5.59 12/01/2023    HGB 12.9 12/01/2023    HCT 38.3 12/01/2023    MCV 94 12/01/2023     12/01/2023      Lab Results   Component Value Date    GLU 89 12/01/2023     12/01/2023    K 4.1 12/01/2023     12/01/2023    CO2 26 12/01/2023    BUN 18 12/01/2023    CREATININE 0.7 12/01/2023    CALCIUM 11.0 (H) 12/01/2023    ALT 17 12/01/2023    AST 19 12/01/2023    ALBUMIN 4.1 12/01/2023    BILITOT 0.6 12/01/2023       Imaging:  US 11/29 reviewed.    Assessment/Plan:  68 y.o. female with multinodular thyroid. Will undergo FNA of two nodules today.    Sedation plan: None    Risks (including, but not limited to, pain, bleeding, infection, damage to nearby structures, treatment failure/recurrence, and the need for additional procedures), potential benefits, and alternatives were discussed with the patient. All questions were answered to the best of my abilities. The patient wishes to proceed. Written informed consent was obtained.    Cindy Chava, PA-C [facility]     "

## 2023-12-13 NOTE — PROGRESS NOTES
Please schedule FNA of 2 right-sided nodules as soon as possible as we the results back before her scheduled parathyroid surgery which is scheduled on January 9th.  If nothing is available on the schedule please message Dr. Haider or Ne to see if we can squeeze her in so that surgery is not delayed.

## 2023-12-13 NOTE — DISCHARGE SUMMARY
Interventional Radiology Short Stay Discharge Summary      Admit Date: 12/13/2023  Discharge Date: 12/13/2023     Hospital Course: Uneventful    Discharge Diagnosis: multinodular thyroid    Discharge Condition: Stable    Discharge Disposition: Home    Diet: Resume prior diet    Activity: activity as tolerated    Follow-up: With referring provider    Cindy Chava, PA-C [facility]

## 2023-12-13 NOTE — PROCEDURES
Interventional Radiology Immediate Post-Procedure Note    Pre-Op Diagnosis: Thyroid Nodule  Post-Op Diagnosis: Same    Procedure:     Procedure performed by: Amanda Link PA-C  Assistants: None    Estimated Blood Loss: Minimal  Specimen Removed: Yes    Findings/description of procedure:  25 g FNA x 6 passes made through nodule in the RIGHT MID lobe (nodule 2 on US).  Adequacy of specimen confirmed.    25 g FNA x 6 passes made through nodule in the RIGHT MID/LOWER lobe (nodule 3 on US).  Adequacy of specimen not confirmed.    No immediate complications. Patient tolerated procedure well. Please see full dictated procedure report for additional details and recommendations.      Cindy Chava, PA-C [facility]

## 2023-12-15 LAB
ADEQUACY: NORMAL
ADEQUACY: NORMAL
FINAL PATHOLOGIC DIAGNOSIS: NORMAL
FINAL PATHOLOGIC DIAGNOSIS: NORMAL
Lab: NORMAL
Lab: NORMAL

## 2023-12-18 ENCOUNTER — PATIENT MESSAGE (OUTPATIENT)
Dept: ENDOCRINOLOGY | Facility: CLINIC | Age: 68
End: 2023-12-18
Payer: MEDICARE

## 2024-01-05 NOTE — PRE-PROCEDURE INSTRUCTIONS
PREOP INSTRUCTIONS:  No food,milk or milk products for 8 hours before surgery.  Clear liquids like water,gatorade,apple juice are allowed up until 2 hours before surgery.  Instructed to follow the surgeon's instructions if they differ from these.  Shower instructions as well as directions to the Surgery Center were given.  Encouraged to wear loose fitting,comfortable clothing.  Medication instructions for pm prior to and am of procedure reviewed.  Instructed to avoid taking vitamins,supplements,aspirin and ibuprofen the morning of surgery.    Patient denies any side effects or issues with anesthesia or sedation.     Patient does not know arrival time.Explained that this information comes from the surgeon's office and if they haven't heard from them by 2 or 3 pm 1/8/2024 to call the office.Patient stated an understanding.

## 2024-01-08 ENCOUNTER — TELEPHONE (OUTPATIENT)
Dept: SURGERY | Facility: CLINIC | Age: 69
End: 2024-01-08
Payer: MEDICARE

## 2024-01-08 DIAGNOSIS — Z98.890 S/P PARATHYROIDECTOMY: Primary | ICD-10-CM

## 2024-01-08 DIAGNOSIS — Z90.89 S/P PARATHYROIDECTOMY: Primary | ICD-10-CM

## 2024-01-08 DIAGNOSIS — E89.2 S/P PARATHYROIDECTOMY: Primary | ICD-10-CM

## 2024-01-09 ENCOUNTER — HOSPITAL ENCOUNTER (OUTPATIENT)
Facility: HOSPITAL | Age: 69
Discharge: HOME | End: 2024-01-09
Attending: STUDENT IN AN ORGANIZED HEALTH CARE EDUCATION/TRAINING PROGRAM | Admitting: STUDENT IN AN ORGANIZED HEALTH CARE EDUCATION/TRAINING PROGRAM
Payer: MEDICARE

## 2024-01-09 ENCOUNTER — ANESTHESIA EVENT (OUTPATIENT)
Dept: SURGERY | Facility: HOSPITAL | Age: 69
End: 2024-01-09
Payer: MEDICARE

## 2024-01-09 ENCOUNTER — ANESTHESIA (OUTPATIENT)
Dept: SURGERY | Facility: HOSPITAL | Age: 69
End: 2024-01-09
Payer: MEDICARE

## 2024-01-09 VITALS
TEMPERATURE: 98 F | HEIGHT: 67 IN | HEART RATE: 98 BPM | BODY MASS INDEX: 28.94 KG/M2 | RESPIRATION RATE: 20 BRPM | OXYGEN SATURATION: 98 % | DIASTOLIC BLOOD PRESSURE: 56 MMHG | SYSTOLIC BLOOD PRESSURE: 102 MMHG | WEIGHT: 184.38 LBS

## 2024-01-09 DIAGNOSIS — E21.0 PRIMARY HYPERPARATHYROIDISM: Primary | ICD-10-CM

## 2024-01-09 DIAGNOSIS — E21.3 HYPERPARATHYROIDISM: ICD-10-CM

## 2024-01-09 LAB
PTH-INTACT SERPL-MCNC: 135.1 PG/ML (ref 9–77)
PTH-INTACT SERPL-MCNC: 154.5 PG/ML (ref 9–77)
PTH-INTACT SERPL-MCNC: 156.7 PG/ML (ref 9–77)
PTH-INTACT SERPL-MCNC: 228.1 PG/ML (ref 9–77)
PTH-INTACT SERPL-MCNC: 271.3 PG/ML (ref 9–77)
PTH-INTACT SERPL-MCNC: 36.7 PG/ML (ref 9–77)
PTH-INTACT SERPL-MCNC: 43.3 PG/ML (ref 9–77)
PTH-INTACT SERPL-MCNC: 50.1 PG/ML (ref 9–77)

## 2024-01-09 PROCEDURE — 63600175 PHARM REV CODE 636 W HCPCS: Mod: JZ,JG | Performed by: STUDENT IN AN ORGANIZED HEALTH CARE EDUCATION/TRAINING PROGRAM

## 2024-01-09 PROCEDURE — 63600175 PHARM REV CODE 636 W HCPCS: Performed by: NURSE ANESTHETIST, CERTIFIED REGISTERED

## 2024-01-09 PROCEDURE — 88331 PATH CONSLTJ SURG 1 BLK 1SPC: CPT | Mod: 26,,, | Performed by: STUDENT IN AN ORGANIZED HEALTH CARE EDUCATION/TRAINING PROGRAM

## 2024-01-09 PROCEDURE — 88307 TISSUE EXAM BY PATHOLOGIST: CPT | Performed by: PATHOLOGY

## 2024-01-09 PROCEDURE — 36000707: Performed by: STUDENT IN AN ORGANIZED HEALTH CARE EDUCATION/TRAINING PROGRAM

## 2024-01-09 PROCEDURE — 71000044 HC DOSC ROUTINE RECOVERY FIRST HOUR: Performed by: STUDENT IN AN ORGANIZED HEALTH CARE EDUCATION/TRAINING PROGRAM

## 2024-01-09 PROCEDURE — 27201037 HC PRESSURE MONITORING SET UP

## 2024-01-09 PROCEDURE — 88307 TISSUE EXAM BY PATHOLOGIST: CPT | Mod: 26,,, | Performed by: PATHOLOGY

## 2024-01-09 PROCEDURE — 27201423 OPTIME MED/SURG SUP & DEVICES STERILE SUPPLY: Performed by: STUDENT IN AN ORGANIZED HEALTH CARE EDUCATION/TRAINING PROGRAM

## 2024-01-09 PROCEDURE — 71000015 HC POSTOP RECOV 1ST HR: Performed by: STUDENT IN AN ORGANIZED HEALTH CARE EDUCATION/TRAINING PROGRAM

## 2024-01-09 PROCEDURE — 83970 ASSAY OF PARATHORMONE: CPT | Mod: 91 | Performed by: STUDENT IN AN ORGANIZED HEALTH CARE EDUCATION/TRAINING PROGRAM

## 2024-01-09 PROCEDURE — 25000003 PHARM REV CODE 250: Performed by: NURSE ANESTHETIST, CERTIFIED REGISTERED

## 2024-01-09 PROCEDURE — 71000033 HC RECOVERY, INTIAL HOUR: Performed by: STUDENT IN AN ORGANIZED HEALTH CARE EDUCATION/TRAINING PROGRAM

## 2024-01-09 PROCEDURE — 60500 EXPLORE PARATHYROID GLANDS: CPT | Mod: ,,, | Performed by: STUDENT IN AN ORGANIZED HEALTH CARE EDUCATION/TRAINING PROGRAM

## 2024-01-09 PROCEDURE — 88331 PATH CONSLTJ SURG 1 BLK 1SPC: CPT | Performed by: PATHOLOGY

## 2024-01-09 PROCEDURE — 36000706: Performed by: STUDENT IN AN ORGANIZED HEALTH CARE EDUCATION/TRAINING PROGRAM

## 2024-01-09 PROCEDURE — D9220A PRA ANESTHESIA: Mod: CRNA,,, | Performed by: NURSE ANESTHETIST, CERTIFIED REGISTERED

## 2024-01-09 PROCEDURE — 88305 TISSUE EXAM BY PATHOLOGIST: CPT | Performed by: PATHOLOGY

## 2024-01-09 PROCEDURE — 36620 INSERTION CATHETER ARTERY: CPT | Mod: 59,,, | Performed by: NURSE ANESTHETIST, CERTIFIED REGISTERED

## 2024-01-09 PROCEDURE — 71000039 HC RECOVERY, EACH ADD'L HOUR: Performed by: STUDENT IN AN ORGANIZED HEALTH CARE EDUCATION/TRAINING PROGRAM

## 2024-01-09 PROCEDURE — 88305 TISSUE EXAM BY PATHOLOGIST: CPT | Mod: 26,,, | Performed by: PATHOLOGY

## 2024-01-09 PROCEDURE — 37000009 HC ANESTHESIA EA ADD 15 MINS: Performed by: STUDENT IN AN ORGANIZED HEALTH CARE EDUCATION/TRAINING PROGRAM

## 2024-01-09 PROCEDURE — D9220A PRA ANESTHESIA: Mod: ANES,,, | Performed by: SURGERY

## 2024-01-09 PROCEDURE — 37000008 HC ANESTHESIA 1ST 15 MINUTES: Performed by: STUDENT IN AN ORGANIZED HEALTH CARE EDUCATION/TRAINING PROGRAM

## 2024-01-09 RX ORDER — DEXAMETHASONE SODIUM PHOSPHATE 4 MG/ML
INJECTION, SOLUTION INTRA-ARTICULAR; INTRALESIONAL; INTRAMUSCULAR; INTRAVENOUS; SOFT TISSUE
Status: DISCONTINUED | OUTPATIENT
Start: 2024-01-09 | End: 2024-01-09

## 2024-01-09 RX ORDER — PROPOFOL 10 MG/ML
VIAL (ML) INTRAVENOUS
Status: DISCONTINUED | OUTPATIENT
Start: 2024-01-09 | End: 2024-01-09

## 2024-01-09 RX ORDER — SUCCINYLCHOLINE CHLORIDE 20 MG/ML
INJECTION INTRAMUSCULAR; INTRAVENOUS
Status: DISCONTINUED | OUTPATIENT
Start: 2024-01-09 | End: 2024-01-09

## 2024-01-09 RX ORDER — ONDANSETRON 2 MG/ML
4 INJECTION INTRAMUSCULAR; INTRAVENOUS DAILY PRN
Status: DISCONTINUED | OUTPATIENT
Start: 2024-01-09 | End: 2024-01-09 | Stop reason: HOSPADM

## 2024-01-09 RX ORDER — BUPIVACAINE HYDROCHLORIDE 2.5 MG/ML
INJECTION, SOLUTION EPIDURAL; INFILTRATION; INTRACAUDAL
Status: DISCONTINUED | OUTPATIENT
Start: 2024-01-09 | End: 2024-01-09 | Stop reason: HOSPADM

## 2024-01-09 RX ORDER — ACETAMINOPHEN 10 MG/ML
INJECTION, SOLUTION INTRAVENOUS
Status: DISCONTINUED | OUTPATIENT
Start: 2024-01-09 | End: 2024-01-09

## 2024-01-09 RX ORDER — ONDANSETRON 2 MG/ML
INJECTION INTRAMUSCULAR; INTRAVENOUS
Status: DISCONTINUED | OUTPATIENT
Start: 2024-01-09 | End: 2024-01-09

## 2024-01-09 RX ORDER — CALCIUM CARBONATE 400(1000)
1 TABLET,CHEWABLE ORAL
Refills: 0 | COMMUNITY
Start: 2024-01-09 | End: 2024-01-19 | Stop reason: ALTCHOICE

## 2024-01-09 RX ORDER — FAMOTIDINE 10 MG/ML
INJECTION INTRAVENOUS
Status: DISCONTINUED | OUTPATIENT
Start: 2024-01-09 | End: 2024-01-09

## 2024-01-09 RX ORDER — TRAMADOL HYDROCHLORIDE 50 MG/1
50 TABLET ORAL EVERY 6 HOURS PRN
Qty: 8 TABLET | Refills: 0 | Status: SHIPPED | OUTPATIENT
Start: 2024-01-09 | End: 2024-01-22 | Stop reason: ALTCHOICE

## 2024-01-09 RX ORDER — SODIUM CHLORIDE 0.9 % (FLUSH) 0.9 %
10 SYRINGE (ML) INJECTION
Status: DISCONTINUED | OUTPATIENT
Start: 2024-01-09 | End: 2024-01-09 | Stop reason: HOSPADM

## 2024-01-09 RX ORDER — ESMOLOL HYDROCHLORIDE 10 MG/ML
INJECTION INTRAVENOUS
Status: DISCONTINUED | OUTPATIENT
Start: 2024-01-09 | End: 2024-01-09

## 2024-01-09 RX ORDER — EPHEDRINE SULFATE 50 MG/ML
INJECTION, SOLUTION INTRAVENOUS
Status: DISCONTINUED | OUTPATIENT
Start: 2024-01-09 | End: 2024-01-09

## 2024-01-09 RX ORDER — MIDAZOLAM HYDROCHLORIDE 1 MG/ML
INJECTION, SOLUTION INTRAMUSCULAR; INTRAVENOUS
Status: DISCONTINUED | OUTPATIENT
Start: 2024-01-09 | End: 2024-01-09

## 2024-01-09 RX ORDER — PHENYLEPHRINE HYDROCHLORIDE 10 MG/ML
INJECTION INTRAVENOUS
Status: DISCONTINUED | OUTPATIENT
Start: 2024-01-09 | End: 2024-01-09

## 2024-01-09 RX ORDER — FENTANYL CITRATE 50 UG/ML
INJECTION, SOLUTION INTRAMUSCULAR; INTRAVENOUS
Status: DISCONTINUED | OUTPATIENT
Start: 2024-01-09 | End: 2024-01-09

## 2024-01-09 RX ORDER — OXYCODONE HYDROCHLORIDE 5 MG/1
5 TABLET ORAL EVERY 6 HOURS PRN
Qty: 12 TABLET | Refills: 0 | Status: SHIPPED | OUTPATIENT
Start: 2024-01-09 | End: 2024-01-09 | Stop reason: HOSPADM

## 2024-01-09 RX ORDER — HYDROMORPHONE HYDROCHLORIDE 1 MG/ML
0.2 INJECTION, SOLUTION INTRAMUSCULAR; INTRAVENOUS; SUBCUTANEOUS EVERY 5 MIN PRN
Status: DISCONTINUED | OUTPATIENT
Start: 2024-01-09 | End: 2024-01-09 | Stop reason: HOSPADM

## 2024-01-09 RX ORDER — LIDOCAINE HYDROCHLORIDE 20 MG/ML
INJECTION, SOLUTION EPIDURAL; INFILTRATION; INTRACAUDAL; PERINEURAL
Status: DISCONTINUED | OUTPATIENT
Start: 2024-01-09 | End: 2024-01-09

## 2024-01-09 RX ADMIN — DEXAMETHASONE SODIUM PHOSPHATE 8 MG: 4 INJECTION, SOLUTION INTRAMUSCULAR; INTRAVENOUS at 12:01

## 2024-01-09 RX ADMIN — EPHEDRINE SULFATE 10 MG: 50 INJECTION INTRAVENOUS at 12:01

## 2024-01-09 RX ADMIN — EPHEDRINE SULFATE 10 MG: 50 INJECTION INTRAVENOUS at 02:01

## 2024-01-09 RX ADMIN — EPHEDRINE SULFATE 15 MG: 50 INJECTION INTRAVENOUS at 12:01

## 2024-01-09 RX ADMIN — ACETAMINOPHEN 1000 MG: 10 INJECTION, SOLUTION INTRAVENOUS at 04:01

## 2024-01-09 RX ADMIN — SODIUM CHLORIDE 0.3 MCG/KG/MIN: 9 INJECTION, SOLUTION INTRAVENOUS at 01:01

## 2024-01-09 RX ADMIN — PHENYLEPHRINE HYDROCHLORIDE 200 MCG: 10 INJECTION INTRAVENOUS at 01:01

## 2024-01-09 RX ADMIN — PHENYLEPHRINE HYDROCHLORIDE 200 MCG: 10 INJECTION INTRAVENOUS at 12:01

## 2024-01-09 RX ADMIN — ONDANSETRON 8 MG: 2 INJECTION INTRAMUSCULAR; INTRAVENOUS at 04:01

## 2024-01-09 RX ADMIN — ESMOLOL HYDROCHLORIDE 30 MG: 100 INJECTION, SOLUTION INTRAVENOUS at 01:01

## 2024-01-09 RX ADMIN — SODIUM CHLORIDE: 0.9 INJECTION, SOLUTION INTRAVENOUS at 12:01

## 2024-01-09 RX ADMIN — PROPOFOL 140 MG: 10 INJECTION, EMULSION INTRAVENOUS at 12:01

## 2024-01-09 RX ADMIN — FENTANYL CITRATE 50 MCG: 50 INJECTION, SOLUTION INTRAMUSCULAR; INTRAVENOUS at 04:01

## 2024-01-09 RX ADMIN — EPHEDRINE SULFATE 15 MG: 50 INJECTION INTRAVENOUS at 02:01

## 2024-01-09 RX ADMIN — MIDAZOLAM HYDROCHLORIDE 1 MG: 1 INJECTION, SOLUTION INTRAMUSCULAR; INTRAVENOUS at 12:01

## 2024-01-09 RX ADMIN — SUCCINYLCHOLINE CHLORIDE 140 MG: 20 INJECTION, SOLUTION INTRAMUSCULAR; INTRAVENOUS at 12:01

## 2024-01-09 RX ADMIN — LIDOCAINE HYDROCHLORIDE 50 MG: 20 INJECTION, SOLUTION EPIDURAL; INFILTRATION; INTRACAUDAL; PERINEURAL at 12:01

## 2024-01-09 RX ADMIN — PROPOFOL 40 MG: 10 INJECTION, EMULSION INTRAVENOUS at 01:01

## 2024-01-09 RX ADMIN — FENTANYL CITRATE 50 MCG: 50 INJECTION, SOLUTION INTRAMUSCULAR; INTRAVENOUS at 12:01

## 2024-01-09 RX ADMIN — FENTANYL CITRATE 50 MCG: 50 INJECTION, SOLUTION INTRAMUSCULAR; INTRAVENOUS at 01:01

## 2024-01-09 RX ADMIN — FAMOTIDINE 20 MG: 10 INJECTION, SOLUTION INTRAVENOUS at 04:01

## 2024-01-09 NOTE — ANESTHESIA PROCEDURE NOTES
Arterial    Diagnosis: parathyroid nodule    Patient location during procedure: done in OR  Timeout: 1/9/2024 1:00 PM  Procedure end time: 1/9/2024 1:08 PM    Staffing  Authorizing Provider: Grant Gonzalez MD  Performing Provider: Shaun Forde CRNA    Staffing  Performed by: Shaun Fored, CRNA  Authorized by: Grant Gonzalez MD    Anesthesiologist was present at the time of the procedure.    Preanesthetic Checklist  Completed: patient identified, IV checked, site marked, risks and benefits discussed, surgical consent, monitors and equipment checked, pre-op evaluation, timeout performed and anesthesia consent givenArterial  Skin Prep: chlorhexidine gluconate  Local Infiltration: none  Orientation: right  Location: radial    Catheter Size: 20 G  Catheter placement by Ultrasound guidance. Heme positive aspiration all ports.   Vessel Caliber: medium, patent, compressibility normal  Vascular Doppler:  not done  Needle advanced into vessel with real time Ultrasound guidance.  Guidewire confirmed in vessel.Insertion Attempts: 1  Assessment  Dressing: secured with tape and tegaderm  Patient: Tolerated well

## 2024-01-09 NOTE — NURSING TRANSFER
Nursing Transfer Note      1/9/2024       Nurse giving handoff:Nirmal GRIMM PACU    Nurse receiving handoff:Grant GRIMM [facility]    Reason patient is being transferred: post procedure    Transfer To: [facility] slot 37    Transfer via stretcher    Transfer with None    Transported by Nirmal RN     Transfer Vital Signs:see flow sheet    Order for Tele Monitor? No    4eyes on Skin: yes    Medicines sent: Tramadol Bedside delivery    Any special needs or follow-up needed: Needs 2 hours total post op recovery, needs to void before going home    Patient belongings transferred with patient:  none    Chart send with patient: Yes    Notified: sister    Patient reassessed at: 1/9/2024 1700 (date, time)    Upon arrival to floor: cardiac monitor applied

## 2024-01-09 NOTE — H&P
The patient has been examined and the H&P has been reviewed:  I concur with the findings and no changes have occurred since H&P was written.    FNA complete with benign findings. Planning for four gland exploration.     Patient cleared for Anesthesia: General    Anesthesia/Surgery risks, benefits and alternative options discussed and understood by patient/family.    There are no hospital problems to display for this patient.      Endocrine Surgery History & Physical      REFERRING PROVIDER: Mela Solomon MD     REASON FOR VISIT: Hyperparathyroidism     HPI: Iliana Joy is a 68 y.o. female patient with a history notable for Osteoporosis, HTN, HLD, and vitamin D deficiency who presents in consultation for primary hyperparathyroidism.  Suspicion for hyperparathyroidism was raised on routine laboratory testing to have elevated calcium levels.       Details of the workup are as follows:      Recent laboratory studies:  Hypercalcemia noted since 2019  Max calcium elevation to 11.1 mg/dL  Parathyroid hormone levels Elevated with hypercalcemia  Most recent PTH level was 109.7 with a corresponding calcium of 11.1, albumin 4.1  Phosphorus: 2.5  Vitamin D: 27 in 8/17/23  24 hour urine calcium: 692 mg/24 hours, Benign Familial Hypocalciuric Hypercalcemia unlikely     Medications:  Vitamin D supplementation: 4414-2747 IU daily  Lithium, thiazide diuretics: none  Calcium supplements or calcimimetic: none     Symptoms:   The patient reports the following: []musculoskeletal pain, [x]Fractures (Humerus 2016 and wrist), []nephrolithiasis, []GERD, []peptic ulcer disease, []abdominal pain, []polydipsia, []polyuria, []pruritis  The patient reports the following neurocognitive symptoms: []brain fog, []concentration difficulties, []fatigue, []forgetfulness, []mood/psychiatric disturbances  The patient denies dysphagia, globus sensation, compression symptoms, or anterior neck pain.  The patient denies hoarseness, voice changes or  increased need to clear the throat.  Bone mineral density: [x]Osteoporosis []Osteopenia []Normal bone density.  Last DEXA 1/28/2019 with T-score -2.6 of hip     Surgical risk factors:  Risk of concurrent thyroid disease: Present. Multiple nodules on CT up to 2.7 cm on the right  Ultrasound of the thyroid pending--scheduled For Nov 29th   History of neck radiation: none  Prior neck surgery: none  Cardiovascular risk: echocardiogram normal in 2018  Antiplatelet therapy and anticoagulation: Denies     Family history:  No family history of endocrinopathies or endocrine cancers including pituitary tumors, pancreatic neuroendocrine tumors, medullary thyroid cancer or pheochromocytoma/paraganglioma.      Localization studies done prior to this visit:  Ultrasound: Pending, scheduled with Endocrinology  Nuclear Medicine Parathyroid Scan: Sestamibi scan done at outside facility in 6/2023, reported non-localizing  4D-CT Parathyroid scan: No adenoma identified 8/21/2023, scan not done to protocol     LABORATORY STUDIES:  I personally and independently reviewed relevant lab test results, including the following:           Lab Results   Component Value Date     CALCIUM 11.1 (H) 08/17/2023     CALCIUM 10.6 (H) 06/19/2021     CALCIUM 10.7 (H) 09/11/2020     .7 (H) 08/17/2023     ALBUMIN 4.2 08/17/2023     ALBUMIN 4.1 06/19/2021     PHOS 2.5 (L) 08/17/2023     [id number] 27 (L) 08/17/2023         PAST MEDICAL HISTORY:      Patient Active Problem List   Diagnosis    Essential hypertension    Primary osteoarthritis of left knee    High cholesterol    Primary hyperparathyroidism    Osteoporosis    Vitamin D deficiency    Colon polyps    Abnormal mammogram    Inguinal hernia of right side without obstruction or gangrene    Right thyroid nodule          PAST SURGICAL HISTORY:        Past Surgical History:   Procedure Laterality Date    HYSTERECTOMY   2008    OOPHORECTOMY   08/2008    ROBOT-ASSISTED LAPAROSCOPIC REPAIR OF  INGUINAL HERNIA Bilateral 7/8/2021     Procedure: ROBOTIC REPAIR, HERNIA, INGUINAL;  Surgeon: Harish Duron MD;  Location: [facility];  Service: General;  Laterality: Bilateral;         MEDICATIONS:  Current Medications          Current Outpatient Medications   Medication Sig Dispense Refill    acetaminophen (TYLENOL) 500 MG tablet Take 1,000 mg by mouth.        alendronate (FOSAMAX) 70 MG tablet TAKE 1 TABLET BY MOUTH ONCE A WEEK 12 tablet 3    ascorbic acid, vitamin C, (VITAMIN C) 100 MG tablet Take 100 mg by mouth.        atorvastatin (LIPITOR) 20 MG tablet TAKE 1 TABLET BY MOUTH ONCE DAILY 90 tablet 3    docusate sodium (COLACE) 250 MG capsule Take 250 mg by mouth.        lisinopriL 10 MG tablet Take 40 mg by mouth once daily.        multivit-min/ferrous fumarate (MULTI VITAMIN ORAL) Take by mouth Daily.        vitamin D (VITAMIN D3) 1000 units Tab Take 2,000 Units by mouth once daily.        amLODIPine (NORVASC) 5 MG tablet TAKE 1 TABLET BY MOUTH ONCE DAILY 90 tablet 3    calcium citrate-vitamin D3 250 mg calcium- 200 unit Tab Take by mouth.        cyanocobalamin, vitamin B-12, (VITAMIN B-12 ORAL) Take 25 Units by mouth once daily.        glucosamine/chondr gonzalez A sod (GLUCOSAMINE-CHONDROITIN) 750-600 mg Tab Take 1 tablet by mouth.        HYDROcodone-acetaminophen (NORCO) 5-325 mg per tablet Take 1 tablet by mouth every 4 (four) hours as needed for Pain. 10 tablet 0    metoclopramide HCl (REGLAN) 10 MG tablet Take 1 tablet (10 mg total) by mouth every 6 (six) hours as needed (Nausea). 14 tablet 0    senna-docusate 8.6-50 mg (PERICOLACE) 8.6-50 mg per tablet Take 1 tablet by mouth 2 (two) times daily.          No current facility-administered medications for this visit.            ALLERGIES:  Review of patient's allergies indicates:  No Known Allergies     SOCIAL HISTORY:  Social History              Socioeconomic History    Marital status:    Tobacco Use    Smoking status: Never   Substance and Sexual  "Activity    Alcohol use: Never             FAMILY HISTORY:        Family History   Problem Relation Age of Onset    Ovarian cancer Sister      Ovarian cancer Maternal Aunt      Cervical cancer Sister           REVIEW OF SYSTEMS:  A detailed review of systems has been reviewed with the patient, pertinent positives and negatives are presented in the note and is otherwise negative.     PHYSICAL EXAMINATION:  Vital Signs: BP (!) 140/74   Pulse 81   Ht 5' 7" (1.702 m)   Wt 85.5 kg (188 lb 6.1 oz)   LMP 01/01/2008   SpO2 100%   BMI 29.50 kg/m²      Constitutional: well-developed, well-nourished, no acute distress   HENT: No lid lag, no exophthalmos, no scleral icterus, moist mucous membranes, normal dentition  Neck: supple, trachea in midline, thyroid is soft and moves well with swallowing, right thyroid nodule is visible on exam and is easily palpable, good neck extension  Heme/Lymph: no cervical or supraclavicular lymphadenopathy  Respiratory: normal respiratory effort, no wheezes or stridor  Cardiovascular: regular rate and rhythm  Extremities: no edema  Skin: warm and dry, no rashes  Neurologic: no resting tremor of outstretched hands, voice normal  Vascular: radial pulses palpable bilaterally  Psychiatric: affect normal     IMAGING STUDIES:  I personally and independently reviewed, visualized and interpreted the images of the below listed radiology studies (including 4D CT scan from 8/21/23) and my findings are notable for No adenoma identified, but the study stopped at the marco antonio..  Reports below for reference.     CT Soft Tissue Neck W WO Contrast 08/21/2023  Impression  Heterogeneous enlarged thyroid gland likely representing mild goiter formation.  Multiple nodules with the largest measuring 27 mm on the right.  Ultrasound evaluation to be considered.     A discrete parathyroid adenoma is not definitively identified.  Please note imaging was performed to the superior margin of the aortic arch and not to the " marco antonio.  If clinically warranted, the patient can be brought back for additional imaging into the mediastinum.     Electronically signed by:     Lonnie Levy  Date:                                                08/21/2023  Time:                                               15:18     IMPRESSION:  I had the pleasure of seeing Ms. Joy in Endocrine Surgical consultation regarding the biochemical diagnosis of primary hyperparathyroidism.      We discussed that hyperparathyroidism can compromise bone and cardiovascular health. In addition to classic symptoms such as kidney stones and bone loss, hyperparathyroidism can be associated with multiple symptoms including fatigue, depression, memory loss, pruritis, polyuria, nocturia, constipation, polydipsia, and musculoskeletal aches and pains. Hyperparathyroidism can also worsen hypertension.  I discussed the implications of non-operative observation as well as the standard of care surgical treatment of primary hyperparathyroidism and my recommendation for parathyroidectomy pending further localization and evaluation of her right thyroid nodule with ultrasound and possible FNA.       We extensively discussed the pros and cons for surgical intervention, in this case parathyroidectomy with intraoperative parathyroid hormone monitoring.  We discussed that in the majority of cases, a single adenoma is the culprit gland. However, multigland disease is possible and multigland disease has a lower likelihood of radiographic localization.  Parathyroidectomy for single gland disease is a same day surgery while four-gland parathyroid exploration may require an overnight stay. We discussed that in all cases, parathyroidectomy has an attendant risk of postoperative hypocalcemia which can be mitigated with calcium and vitamin D supplementation. Other possible complications associated with this may include, but may not be restricted to hoarseness, recurrent laryngeal nerve injury -  temporary or permanent, superior laryngeal nerve injury - temporary or permanent, hypocalcemia and hypoparathyroidism - temporary or permanent, neck hematoma, bleeding, infection, scarring, wound healing problems and possible death. We discussed that in rare cases, parathyroid exploration may be negative. Recurrent and persistent disease are also possible.       All questions were answered and the patient expressed understanding of all the risks, benefits and alternatives, and agreed to proceed with the plan despite the risks.  Surgical consent was signed and witnessed.     Problem List Items Addressed This Visit                  Endocrine     Osteoporosis (Chronic)       Indication for parathyroidectomy. See Hyperparathyroidism.           Vitamin D deficiency (Chronic)       Longstanding history of vitamin D deficiency. Last level was 27 in 8/2023.     - Continue supplementation, defer to Endocrinology to adjust between ergocalciferol or cholecalciferol            Primary hyperparathyroidism - Primary       69 yo F with osteoporosis and a 2.7 cm right thyroid nodule presenting for evaluation of her hyperparathyroidism. Workup consistent with symptomatic primary hyperparathyroidism and she has osteoporosis with fractures and hypercalciuria, therefore meets criteria for parathyroid surgery.  Localization studies are incomplete and we need further evaluation of her right thyroid nodule to ensure concurrent thyroid surgery is not indicated.      - OR for parathyroidectomy with intraoperative PTH monitoring  - Formal thyroid ultrasound to evaluate her concurrent thyroid pathology. Possible FNA if indicated, currently scheduled with Endocrinology  - Her 4D CT neck was an incomplete study.  Will contact the radiology department to have this study repeated with the appropriate protocol  - Patient previously had a sestamibi scan, will have the patient bring in the disk and report for review  - The patient has been advised  to stay hydrated and avoid the use of calcium supplements.              Right thyroid nodule       2.7 cm right thyroid nodule.  Previously followed.      - Ultrasound as planned, FNA if indicated            Patient was seen and evaluated with surgery resident Yemi Burroughs MD.     Kathryn Rosen MD  Staff Surgeon  Endocrine Surgery

## 2024-01-09 NOTE — ANESTHESIA PREPROCEDURE EVALUATION
[facility]  Anesthesia Pre-Operative Evaluation         Patient Name: Iliana Joy  YOB: 1955  MRN: [id number]    SUBJECTIVE:     Pre-operative evaluation for Procedure(s) (LRB):  PARATHYROIDECTOMY (N/A)     01/09/2024    Iliana Joy is a 68 y.o. female w/ a significant PMHx of HTN, OA, and thyroid nodule     Patient now presents for the above procedure(s).      LDA: None documented.        Prev airway: None documented.    Drips: None documented.       Patient Active Problem List   Diagnosis    Essential hypertension    Primary osteoarthritis of left knee    High cholesterol    Primary hyperparathyroidism    Osteoporosis    Vitamin D deficiency    Colon polyps    Abnormal mammogram    Inguinal hernia of right side without obstruction or gangrene    Right thyroid nodule       Review of patient's allergies indicates:  No Known Allergies    Current Outpatient Medications:  No current facility-administered medications for this encounter.    Past Surgical History:   Procedure Laterality Date    HYSTERECTOMY  2008    OOPHORECTOMY  08/2008    ROBOT-ASSISTED LAPAROSCOPIC REPAIR OF INGUINAL HERNIA Bilateral 7/8/2021    Procedure: ROBOTIC REPAIR, HERNIA, INGUINAL;  Surgeon: Harish Duron MD;  Location: [facility];  Service: General;  Laterality: Bilateral;       Social History     Socioeconomic History    Marital status:    Tobacco Use    Smoking status: Never   Substance and Sexual Activity    Alcohol use: Never       OBJECTIVE:     Vital Signs Range (Last 24H):         Significant Labs:  Lab Results   Component Value Date    WBC 5.59 12/01/2023    HGB 12.9 12/01/2023    HCT 38.3 12/01/2023     12/01/2023    ALT 17 12/01/2023    AST 19 12/01/2023     12/01/2023    K 4.1 12/01/2023     12/01/2023    CREATININE 0.7 12/01/2023    BUN 18 12/01/2023    CO2 26 12/01/2023       Diagnostic Studies: No relevant studies.    EKG:   Results for orders placed or performed during  the hospital encounter of 07/08/21   EKG 12-lead    Collection Time: 07/08/21  7:15 AM    Narrative    Test Reason : I10,    Vent. Rate : 079 BPM     Atrial Rate : 079 BPM     P-R Int : 190 ms          QRS Dur : 074 ms      QT Int : 360 ms       P-R-T Axes : -07 -08 -07 degrees     QTc Int : 412 ms    Normal sinus rhythm  Moderate voltage criteria for LVH, may be normal variant  Inferior infarct ,age undetermined  Abnormal ECG  No previous ECGs available  Confirmed by Peri Nogueira MD ([id number]) on 7/16/2021 5:54:40 PM    Referred By: CORNELIUS GILLIS           Confirmed By:Peri Nogueira MD       2D ECHO:  TTE:  No results found for this or any previous visit.    TRAVIS:  No results found for this or any previous visit.    ASSESSMENT/PLAN:           Pre-op Assessment    I have reviewed the Patient Summary Reports.     I have reviewed the Nursing Notes. I have reviewed the NPO Status.   I have reviewed the Medications.     Review of Systems  Anesthesia Hx:  No problems with previous Anesthesia   Neg history of prior surgery.          Denies Family Hx of Anesthesia complications.    Denies Personal Hx of Anesthesia complications.                    Hematology/Oncology:  Hematology Normal   Oncology Normal                                   EENT/Dental:  EENT/Dental Normal           Cardiovascular:     Hypertension                                        Pulmonary:  Pulmonary Normal                       Renal/:  Renal/ Normal                 Hepatic/GI:  Hepatic/GI Normal                 Musculoskeletal:  Arthritis               Neurological:  Neurology Normal                                      Endocrine:  Endocrine Normal            Dermatological:  Skin Normal    Psych:  Psychiatric Normal                    Physical Exam  General: Cooperative, Alert and Oriented    Airway:  Mallampati: III / II  Mouth Opening: Normal  TM Distance: Normal  Tongue: Normal  Neck ROM: Normal ROM    Dental:  Dentures,  Edentulous    Chest/Lungs:  Clear to auscultation, Normal Respiratory Rate    Heart:  Rate: Normal        Anesthesia Plan  Type of Anesthesia, risks & benefits discussed:    Anesthesia Type: Gen ETT  Intra-op Monitoring Plan: Standard ASA Monitors and Art Line  Post Op Pain Control Plan: multimodal analgesia and IV/PO Opioids PRN  Induction:  IV  Airway Plan: Video and Direct, Post-Induction  Informed Consent: Informed consent signed with the Patient and all parties understand the risks and agree with anesthesia plan.  All questions answered.   ASA Score: 3  Day of Surgery Review of History & Physical: H&P Update referred to the surgeon/provider.    Ready For Surgery From Anesthesia Perspective.     .

## 2024-01-09 NOTE — ANESTHESIA PROCEDURE NOTES
Intubation    Date/Time: 1/9/2024 12:26 PM    Performed by: Shaun Forde CRNA  Authorized by: Grant Gonzalez MD    Intubation:     Induction:  Intravenous    Intubated:  Postinduction    Mask Ventilation:  Easy with oral airway    Attempts:  1    Attempted By:  CRNA    Method of Intubation:  Video laryngoscopy    Blade:  Harvey 3    Laryngeal View Grade: Grade I - full view of cords      Difficult Airway Encountered?: No      Complications:  None    Airway Device:  EMG ETT (NIMS)    Airway Device Size:  7.0    Style/Cuff Inflation:  Cuffed    Tube secured:  22    Secured at:  The lips    Placement Verified By:  Capnometry    Complicating Factors:  None    Findings Post-Intubation:  BS equal bilateral and atraumatic/condition of teeth unchanged

## 2024-01-09 NOTE — OP NOTE
[facility]  Endocrine Surgery  Operative Report         Date of Procedure: 1/9/2024     Procedure: Procedure(s) (LRB):  PARATHYROIDECTOMY (N/A)     Indications: This patient presents with primary hyperparathyroidism.  Preoperative imaging did not localize a dominant parathyroid adenoma.  The patient now presents for a parathyroidectomy with intraoperative PTH monitoring.     Surgeon(s) and Role:     * Kathryn Rosen MD - Primary     * Reed Calderon MD - Resident - Assisting (PGY4)    Pre-Operative Diagnosis: Primary hyperparathyroidism [E21.0]    Post-Operative Diagnosis: Primary hyperparathyroidism [E21.0]    Anesthesia: General    Procedures:   Parathyroidectomy, bilateral exploration, excision of right inferior intrathyroidal parathyroid adenoma and left inferior parathyroid adenoma.  Bulky right level 6 lymph nodes excised adjacent to right superior parathyroid gland.  Intraoperative monitoring and interpretation of bilateral cranial nerves (vagus and recurrent laryngeal nerves) using the Futon system  Intraoperative PTH level sampling and interpretation     Intraoperative Findings:   Right superior parathyroid gland identified, normal in size and color, marked with a small clip, gland remains in situ.  Right inferior parathyroid adenoma was identified within the right inferior thyroid lobe in an exophytic projection.  Confirmed parathyroid tissue with frozen section, reported as hypercellular.  Gland excised.  Inappropriate change in PTH levels after excision of right inferior parathyroid adenoma.  Left inferior parathyroid gland identified, mildly enlarged, normal in color.  Gland excised, confirmed parathyroid tissue with frozen section, reported as hypercellular.  Left superior parathyroid gland identified, normal in size and color, marked with a small clip, gland remains in situ.  Appropriate decrease in PTH following excision of left inferior parathyroid adenoma.  The  bilateral recurrent laryngeal nerves were identified and preserved.  Function was verified using the nerve monitoring system.    Description of the Procedure:  The patient was seen in the Holding Room. The risks, benefits, complications, treatment options, and expected outcomes were discussed with the patient. The possibilities of reaction to medication, pulmonary aspiration, bleeding, infection, finding a normal parathyroid tissue, inability to identify all explored parathyroid glands, recurrent or persistent hyperparathyroidism, temporary or permanent hypocalcemia, temporary or permanent hypoparathyroidism, superior laryngeal or recurrent laryngeal nerve damage, the need for additional procedures, failure to diagnose a condition, creating a complication requiring transfusion or operation, stroke, death and imponderables. The patient concurred with the proposed plan and agreed to proceed despite the risks, giving informed consent.  The site of surgery was confirmed and marked. The patient was taken to Operating Room, identified as Iliana Joy and the procedure verified as parathyroidectomy with intraoperative PTH monitoring.     Induction of general anesthesia was performed, the patient was intubated with an electromyography endotracheal tube, and the anesthesia team placed all appropriate lines.  A baseline PTH level was drawn and resulted as 156.7.  The patient was positioned by the operating room, anesthesia, and surgical staff in a modified beachchair position with a shoulder roll, the neck supported in an extended position, the arms padded and tucked.  An intraoperative ultrasound was performed prior to incision and noted the thyroid nodules but no clear parathyroid adenoma.  The surgical field was prepped and draped in standard sterile fashion.  A timeout was performed.  A 6 cm transverse cervical incision was created below the cricoid cartilage within a natural skin fold. Dissection was carried down through  the platysma layer. Once this was completed, sub-platysmal flaps were raised superiorly to the thyroid cartilage and inferiorly to the sternal notch. The strap muscles were identified and divided at the midline. Sharp and blunt dissection were used to mobilize the right thyroid lobe in a medial direction.  This was challenging due to the size of the thyroid gland and the multiple nodules.  The right superior parathyroid gland was identified first.  It was deep to the course of the recurrent laryngeal nerve as it coursed posterior and medial to the tubercle of Zuckerkandl.  The superior parathyroid gland appeared normal in size and color, it was marked with a small clip and remains in situ.      There was an exophytic thyroid nodule along the right inferior thyroid lobe.  This corresponded to the preoperative ultrasound finding suggesting a possible adenoma.  There was no parathyroid tissue visible, but with some dissection, the exophytic projection of thyroid was  and divided from the bulk of the thyroid parenchyma with the Ligasure.  On the back table, this thyroid tissue was divided and the right inferior parathyroid gland was identified within this thyroid tissue.  A biopsy of the intrathyroidal right inferior parathyroid adenoma was sent for frozen section and confirmed hypercellular parathyroid tissue.    A time zero PTH level was drawn at the time of right inferior intrathyroidal parathyroid adenoma specimen extraction, with subsequent levels at 10, 15 and 20 minutes post-excision.  These resulted at time 0 = 135.1, 10 min = 271.3, 15 min = 228.1, and 20 min = 154.5.      As the PTH levels did not drop, recurrent laryngeal nerve signal was confirmed with the nerve monitoring system prior to proceeding to the contralateral side.    Attention was then turned to the left side to complete a four gland exploration.  The left thyroid lobe was medialized and the carotid sheath was exposed.  The left inferior  parathyroid gland was identified immediately on mobilizing the left thyroid lobe as it was situated just along the capsule of the inferior thyroid lobe.  The left inferior parathyroid gland was slightly enlarged but normal in color, there was a smaller possible second inferior parathyroid gland adjacent to the inferior gland.  This was marked and dissection continued to find the left superior parathyroid gland deep and lateral to the recurrent laryngeal nerve in a mirrored position as the right side the tracheoesophageal groove.  The left superior parathyroid gland appeared normal in size and color and was preserved in situ and marked with a small clip.  As the PTH levels still did not drop, the left inferior parathyroid gland was excised.      A new set of PTH levels was drawn 10, 15 and 20 minutes after excision of the left inferior parathyroid adenoma. These resulted as 50.1, 43.3 and 36.7 respectively.    The surgical bed was irrigated and inspected carefully. Multiple Valsalva maneuvers were performed at 30-40 cm of water and additional hemostasis was achieved as necessary with focal application of bipolar cautery. This was augmented with Fibrillar which was placed in the surgical bed.  Recurrent laryngeal nerve function was verified using the nerve monitor prior to closure.  0.25% Marcaine was injected into the subcutaneous tissue of the incision for post-op analgesia. The strap muscles were then closed with interrupted 3-0 Vicryl suture.  The platysma was closed with interrupted 3-0 Vicryl suture, and the skin incision was closed with a 6-0 Vicryl subcuticular knot-less closure. Sterile skin glue was applied to the incision.    A debrief was performed.  Specimens were confirmed.  Instrument, sponge, and needle counts were reported correct prior to closure and at the conclusion of the case.  The family was updated at the completion of the case.    Complications: No    Estimated Blood Loss (EBL): less than 50  mL           Drains: None    Implants: None    Specimens:   Specimen (24h ago, onward)       Start     Ordered    01/09/24 1550  Specimen to Pathology, Surgery General Surgery  Once        Comments: Pre-op Diagnosis: Primary hyperparathyroidism [E21.0]Procedure(s):PARATHYROIDECTOMY Number of specimens: 5Name of specimens: 1.) Right Inferior Parathyroid Biopsy, Frozen.2.) Left Inferior Parathyroid Biopsy, Frozen.3.) Right Inferior Intra-thyroidal Parathyroid Adenoma, Permanent.4.) Left Inferior Parathyroid Adenoma, Permanent.5.) Right Level 6 Lymph Nodes, Permanent.     References:    Click here for ordering Quick Tip   Question Answer Comment   Procedure Type: General Surgery    Specimen Class: Routine/Screening    Which provider would you like to cc? JULIAN SMITH        01/09/24 1550                           Condition: stable    Disposition: PACU - hemodynamically stable.    Attestation: I was present and scrubbed for the entire procedure.

## 2024-01-09 NOTE — BRIEF OP NOTE
[facility] - Surgery ([facility])  Brief Operative Note    Surgery Date: 1/9/2024     Surgeon(s) and Role:     * Kathryn Rosen MD - Primary     * Reed Calderon MD - Resident - Assisting        Pre-op Diagnosis:  Primary hyperparathyroidism [E21.0]    Post-op Diagnosis:  Post-Op Diagnosis Codes:     * Primary hyperparathyroidism [E21.0]    Procedure(s) (LRB):  PARATHYROIDECTOMY (N/A)    Anesthesia: General    Operative Findings: Four gland exploration with excision of right inferior (intrathyroidal) and left inferior parathyroid glands. Appropriate post-excision PTH response.     Estimated Blood Loss: 15mL         Specimens:   Specimen (24h ago, onward)       Start     Ordered    01/09/24 1550  Specimen to Pathology, Surgery General Surgery  Once        Comments: Pre-op Diagnosis: Primary hyperparathyroidism [E21.0]Procedure(s):PARATHYROIDECTOMY Number of specimens: 5Name of specimens: 1.) Right Inferior Parathyroid Biopsy, Frozen.2.) Left Inferior Parathyroid Biopsy, Frozen.3.) Right Inferior Intra-thyroidal Parathyroid Adenoma, Permanent.4.) Left Inferior Parathyroid Adenoma, Permanent.5.) Right Level 6 Lymph Nodes, Permanent.     References:    Click here for ordering Quick Tip   Question Answer Comment   Procedure Type: General Surgery    Specimen Class: Routine/Screening    Which provider would you like to cc? KATHRYN ROSEN        01/09/24 1550                      Discharge Note    OUTCOME: Patient tolerated treatment/procedure well without complication and is now ready for discharge.    DISPOSITION: Home or Self Care    FINAL DIAGNOSIS:  Primary hyperparathyroidism    FOLLOWUP: In clinic    DISCHARGE INSTRUCTIONS:    Discharge Procedure Orders   Diet Adult Regular     Other restrictions (specify):   Order Comments: Post-Operative Instructions: Parathyroidectomy    MEDICATIONS  You are being discharged home on the following medications:    Pain medication  Narcotic medication (oxycodone) has been  prescribed by your doctor for post-operative pain.   If you prefer, you may take Tylenol (acetaminophen).  Cepacol lozenges: Use as needed for sore throat.  These can be purchased at the pharmacy.    You may resume taking your normal medications, with the EXCEPTION of the following:    Please check with your surgeon and internist/cardiologist for specific instructions about when you should re-start:  Apixaban (Eliquis), Clopidogrel (Plavix), Dabigatran (Pradaxa), Dipyridamole (Aggrenox), Rivaroxaban (Xarelto), Warfarin (Coumadin), or any other type of blood thinner you may be taking.  Aspirin & anti-inflammatories (i.e. Goody's, Excedrin, ibuprofen, Advil, Aleve): May begin 72 hours after surgery.  Vitamins, minerals, and herbal supplements: Please wait 1 week after surgery to restart these medications    WOUND CARE  Please use your ice pack for 30 minutes on / 30 minutes off for at least the first 7 days.  You will be discharged from the hospital with your incision covered by skin glue that will stay on until your postoperative appointment.  It is normal to develop a lump under the incision, this is expected and is related to the healing process.  The lump will gradually go away with time.  You may shower when you return home after the surgery.  No bathing or swimming (do not submerge in water) until cleared by your surgeon.  Please notify your physician if your incision is red, warm/hot, if you have an increase in swelling, any new drainage, or if you have a fever >101.5 F.  Please call 911 or proceed to the Emergency Room if you have difficulty breathing.    ACTIVITIES  You may resume normal activities, depending on your energy level.  Please refrain from driving for at least 3 days, or until you have complete mobility of your neck.  Do not drive if you are taking narcotic pain medication.  Please refrain from heavy lifting (10 lbs.) for 10 days.    If you have any questions or concerns, please call the surgeon's  office at [phone].      Future Appointments  1/19/2024  10:00 AM   JEFF DHRUV                PATI JEFF            [place]  1/22/2024  2:30 PM    Kathryn Rosen MD      [facility]NEY [facility]  2/22/2024  2:00 PM    Mela Solomon MD       [facility]ER [facility]   Do NOT take your calcium supplements in the morning before your lab appointment.     Notify your health care provider if you experience any of the following:  temperature >100.4     Notify your health care provider if you experience any of the following:  persistent nausea and vomiting or diarrhea     Notify your health care provider if you experience any of the following:  severe uncontrolled pain     Notify your health care provider if you experience any of the following:  increased confusion or weakness     No dressing needed     Activity as tolerated

## 2024-01-09 NOTE — TRANSFER OF CARE
"Anesthesia Transfer of Care Note    Patient: Iliana Joy    Procedure(s) Performed: Procedure(s) (LRB):  PARATHYROIDECTOMY (N/A)    Patient location: PACU    Anesthesia Type: general    Transport from OR: Transported from OR on 6-10 L/min O2 by face mask with adequate spontaneous ventilation    Post pain: adequate analgesia    Post assessment: tolerated procedure well and no apparent anesthetic complications    Post vital signs: stable    Level of consciousness: awake and alert    Nausea/Vomiting: no nausea/vomiting    Complications: none    Transfer of care protocol was followed    Last vitals: Visit Vitals  BP (!) 160/74 (BP Location: Left arm, Patient Position: Lying)   Pulse 77   Temp 36.8 °C (98.2 °F) (Oral)   Resp 18   Ht 5' 7" (1.702 m)   Wt 83.6 kg (184 lb 6.4 oz)   LMP 01/01/2008   SpO2 98%   Breastfeeding No   BMI 28.88 kg/m²     "

## 2024-01-10 NOTE — PLAN OF CARE
Pt discharged per orders. AAOx'a 3. VSS. No s/s of acute distress. Resp even and unlabored.Denies complaints of pain. IV with cathter tip intact  \removed prior to discharge. Medication delivered at bedside.Reviewed discharge instructions, follow up care/appointments with patient and sister. Patient and sister verbalized understanding of discharge and follow up care/appointments.  spoke with patient and sister prior to discharge.Discharged with all personal belongings.Escorted out with staff in wheelchair.Pt transported home via personal transportation.

## 2024-01-10 NOTE — ANESTHESIA POSTPROCEDURE EVALUATION
Anesthesia Post Evaluation    Patient: Iliana Joy    Procedure(s) Performed: Procedure(s) (LRB):  PARATHYROIDECTOMY (N/A)    Final Anesthesia Type: general      Patient location during evaluation: PACU  Patient participation: Yes- Able to Participate  Level of consciousness: awake and alert  Post-procedure vital signs: reviewed and stable  Pain management: adequate  Airway patency: patent  GOKUL mitigation strategies: Multimodal analgesia, Preoperative use of mandibular advancement devices or oral appliances, Intraoperative administration of CPAP, nasopharyngeal airway, or oral appliance during sedation, Extubation while patient is awake and Verification of full reversal of neuromuscular block  PONV status at discharge: No PONV  Anesthetic complications: no      Cardiovascular status: blood pressure returned to baseline, hemodynamically stable and stable  Respiratory status: unassisted and spontaneous ventilation  Hydration status: euvolemic  Follow-up not needed.              Vitals Value Taken Time   /72 01/09/24 1818   Temp 36.4 °C (97.5 °F) 01/09/24 1716   Pulse 110 01/09/24 1822   Resp 20 01/09/24 1815   SpO2 92 % 01/09/24 1822   Vitals shown include unvalidated device data.      Event Time   Out of Recovery 17:15:00         Pain/Odell Score: Odell Score: 10 (1/9/2024  6:15 PM)

## 2024-01-19 ENCOUNTER — LAB VISIT (OUTPATIENT)
Dept: LAB | Facility: HOSPITAL | Age: 69
End: 2024-01-19
Attending: STUDENT IN AN ORGANIZED HEALTH CARE EDUCATION/TRAINING PROGRAM
Payer: MEDICARE

## 2024-01-19 DIAGNOSIS — Z90.89 S/P PARATHYROIDECTOMY: ICD-10-CM

## 2024-01-19 DIAGNOSIS — Z98.890 S/P PARATHYROIDECTOMY: ICD-10-CM

## 2024-01-19 LAB
ALBUMIN SERPL BCP-MCNC: 3.6 G/DL (ref 3.5–5.2)
ANION GAP SERPL CALC-SCNC: 8 MMOL/L (ref 8–16)
BUN SERPL-MCNC: 17 MG/DL (ref 8–23)
CALCIUM SERPL-MCNC: 9.2 MG/DL (ref 8.7–10.5)
CHLORIDE SERPL-SCNC: 104 MMOL/L (ref 95–110)
CO2 SERPL-SCNC: 24 MMOL/L (ref 23–29)
CREAT SERPL-MCNC: 0.6 MG/DL (ref 0.5–1.4)
EST. GFR  (NO RACE VARIABLE): >60 ML/MIN/1.73 M^2
GLUCOSE SERPL-MCNC: 86 MG/DL (ref 70–110)
PHOSPHATE SERPL-MCNC: 3.7 MG/DL (ref 2.7–4.5)
POTASSIUM SERPL-SCNC: 4.2 MMOL/L (ref 3.5–5.1)
SODIUM SERPL-SCNC: 136 MMOL/L (ref 136–145)

## 2024-01-19 PROCEDURE — 80069 RENAL FUNCTION PANEL: CPT | Performed by: STUDENT IN AN ORGANIZED HEALTH CARE EDUCATION/TRAINING PROGRAM

## 2024-01-19 PROCEDURE — 36415 COLL VENOUS BLD VENIPUNCTURE: CPT | Mod: PO | Performed by: STUDENT IN AN ORGANIZED HEALTH CARE EDUCATION/TRAINING PROGRAM

## 2024-01-22 ENCOUNTER — OFFICE VISIT (OUTPATIENT)
Dept: SURGERY | Facility: CLINIC | Age: 69
End: 2024-01-22
Payer: MEDICARE

## 2024-01-22 DIAGNOSIS — E04.1 RIGHT THYROID NODULE: ICD-10-CM

## 2024-01-22 DIAGNOSIS — Z90.89 S/P PARATHYROIDECTOMY: Primary | ICD-10-CM

## 2024-01-22 DIAGNOSIS — E21.0 PRIMARY HYPERPARATHYROIDISM: ICD-10-CM

## 2024-01-22 DIAGNOSIS — E55.9 VITAMIN D DEFICIENCY: Chronic | ICD-10-CM

## 2024-01-22 DIAGNOSIS — Z98.890 S/P PARATHYROIDECTOMY: Primary | ICD-10-CM

## 2024-01-22 PROBLEM — E89.2 S/P PARATHYROIDECTOMY: Status: ACTIVE | Noted: 2024-01-22

## 2024-01-22 LAB
FINAL PATHOLOGIC DIAGNOSIS: NORMAL
FROZEN SECTION DIAGNOSIS: NORMAL
FROZEN SECTION FOOTNOTE: NORMAL
GROSS: NORMAL
Lab: NORMAL

## 2024-01-22 PROCEDURE — 99024 POSTOP FOLLOW-UP VISIT: CPT | Mod: S$GLB,,, | Performed by: STUDENT IN AN ORGANIZED HEALTH CARE EDUCATION/TRAINING PROGRAM

## 2024-01-22 PROCEDURE — 99999 PR PBB SHADOW E&M-EST. PATIENT-LVL II: CPT | Mod: PBBFAC,,, | Performed by: STUDENT IN AN ORGANIZED HEALTH CARE EDUCATION/TRAINING PROGRAM

## 2024-01-22 NOTE — PROGRESS NOTES
Postoperative Endocrine Surgery Clinic Note    Reason for visit / Chief complaint: Postoperative evaluation  Endocrinologist: Mela Solomon MD  Procedure:  Parathyroidectomy  Procedure Date: 1/9/2024    Subjective:     Iliana Joy returns today for postoperative evaluation, she is approximately 2 weeks post op.    Procedure: Parathyroidectomy    Operative findings:   Right superior parathyroid gland identified, normal in size and color, marked with a small clip, gland remains in situ.  Right inferior parathyroid adenoma was identified within the right inferior thyroid lobe in an exophytic projection.  Confirmed parathyroid tissue with frozen section, reported as hypercellular.  Gland excised.  Inappropriate change in PTH levels after excision of right inferior parathyroid adenoma.  Left inferior parathyroid gland identified, mildly enlarged, normal in color.  Gland excised, confirmed parathyroid tissue with frozen section, reported as hypercellular.  Left superior parathyroid gland identified, normal in size and color, marked with a small clip, gland remains in situ.  Appropriate decrease in PTH following excision of left inferior parathyroid adenoma.  The bilateral recurrent laryngeal nerves were identified and preserved.  Function was verified using the nerve monitoring system.    She has had an uncomplicated postoperative course. She denies signs or symptoms of hypocalcemia. She was taking calcium supplements, but did not take any prior to the lab draw and stopped when directed.  Her phonation is at baseline.  Pain is well controlled.    Current Outpatient Medications   Medication Sig Dispense Refill    acetaminophen (TYLENOL) 500 MG tablet Take 1,000 mg by mouth daily as needed.      ascorbic acid, vitamin C, (VITAMIN C) 100 MG tablet Take 100 mg by mouth.      lisinopriL 10 MG tablet Take 40 mg by mouth every morning.      multivit-min/ferrous fumarate (MULTI VITAMIN ORAL) Take by mouth Daily.      vitamin D  (VITAMIN D3) 1000 units Tab Take 2,000 Units by mouth once daily.       No current facility-administered medications for this visit.     Review of patient's allergies indicates:  No Known Allergies    Review of Systems  Negative except as per HPI.     Objective:   LMP 01/01/2008     General: alert, well appearing, and in no distress  Neck: neck is flat, no erythema or edema  Incision: well approximated, healing well  Neurological: phonation is normal    Labs:  Lab Results   Component Value Date    CALCIUM 9.2 01/19/2024    ALBUMIN 3.6 01/19/2024    PHOS 3.7 01/19/2024    [id number] 27 (L) 08/17/2023       Pathology:  Final Pathologic Diagnosis   Date Value Ref Range Status   01/09/2024   Final    1. Right inferior parathyroid (biopsy):  - Cellular parathyroid    2. Left inferior parathyroid (biopsy):  - Benign parathyroid    3. &quot;Right inferior parathyroid adenoma &quot;(excision; 568.2 mg):   - Hypercellular parathyroid    4. &quot;Left inferior parathyroid adenoma&quot; (excision, 72.1 mg):  - Scant benign parathyroid tissue    5. Lymph nodes, right level 6 (regional resection):  - 2 lymph nodes, negative for tumor        Comment:     Interp By Staci Gramajo MD, Signed on 01/22/2024 at 10:22       Assessment and Plan     Problem List Items Addressed This Visit          Endocrine    Vitamin D deficiency (Chronic)    Current Assessment & Plan     - Continue vitamin D supplementation, OTC vitamin D3 2000 IU daily  - Check vitamin D level with 6 month labs         Primary hyperparathyroidism    Overview     Symptomatic primary hyperparathyroidism, osteoporosis with fractures and hypercalciuria s/p parathyroidectomy (bilateral exploration, excision of bilateral inferior parathyroid adenomas) 01/09/2024.         Current Assessment & Plan     Doing well postoperatively.  Voice adequate.  No hypocalcemia symptoms.    - Discontinue calcium supplements  - Discontinue narcotics  - Reviewed pathology  - Incision  care discussed, scar massage and routine scar care information provided  - Recommended daily dietary calcium intake equal to about 8438-1168 mg  - Recommend 6050-8525 IU vitamin D3 supplementation daily, available over the counter  - Obtain labs in six months to document cure of primary hyperparathyroidism with RFP, PTH and Vitamin D         Right thyroid nodule    Current Assessment & Plan     Right mid and right lower pole thyroid nodules, asymptomatic.  FNA biopsies done 12/13/2023, both benign follicular nodules.         S/P parathyroidectomy - Primary    Overview     Symptomatic primary hyperparathyroidism, osteoporosis with fractures and hypercalciuria s/p parathyroidectomy (bilateral exploration, excision of bilateral inferior parathyroid adenomas) 01/09/2024.          Current Assessment & Plan     - See Primary hyperparathyroidism           Patient was seen and evaluated with surgery resident Reed Calderon MD.    Kathryn Rosen MD  Staff Surgeon  Endocrine Surgery  1/22/24

## 2024-01-23 NOTE — ASSESSMENT & PLAN NOTE
- Continue vitamin D supplementation, OTC vitamin D3 2000 IU daily  - Check vitamin D level with 6 month labs

## 2024-01-23 NOTE — ASSESSMENT & PLAN NOTE
Doing well postoperatively.  Voice adequate.  No hypocalcemia symptoms.    - Discontinue calcium supplements  - Discontinue narcotics  - Reviewed pathology  - Incision care discussed, scar massage and routine scar care information provided  - Recommended daily dietary calcium intake equal to about 8353-4737 mg  - Recommend 4864-6676 IU vitamin D3 supplementation daily, available over the counter  - Obtain labs in six months to document cure of primary hyperparathyroidism with RFP, PTH and Vitamin D

## 2024-01-23 NOTE — ASSESSMENT & PLAN NOTE
Right mid and right lower pole thyroid nodules, asymptomatic.  FNA biopsies done 12/13/2023, both benign follicular nodules.

## 2024-02-22 ENCOUNTER — OFFICE VISIT (OUTPATIENT)
Dept: ENDOCRINOLOGY | Facility: CLINIC | Age: 69
End: 2024-02-22
Payer: MEDICARE

## 2024-02-22 VITALS
BODY MASS INDEX: 28.75 KG/M2 | DIASTOLIC BLOOD PRESSURE: 82 MMHG | WEIGHT: 183.19 LBS | SYSTOLIC BLOOD PRESSURE: 120 MMHG | HEIGHT: 67 IN

## 2024-02-22 DIAGNOSIS — E55.9 VITAMIN D DEFICIENCY: Chronic | ICD-10-CM

## 2024-02-22 DIAGNOSIS — R79.89 ABNORMAL THYROID BLOOD TEST: ICD-10-CM

## 2024-02-22 DIAGNOSIS — E21.0 PRIMARY HYPERPARATHYROIDISM: ICD-10-CM

## 2024-02-22 DIAGNOSIS — M81.0 AGE-RELATED OSTEOPOROSIS WITHOUT CURRENT PATHOLOGICAL FRACTURE: Chronic | ICD-10-CM

## 2024-02-22 DIAGNOSIS — E04.1 RIGHT THYROID NODULE: ICD-10-CM

## 2024-02-22 DIAGNOSIS — E04.2 MULTIPLE THYROID NODULES: Primary | ICD-10-CM

## 2024-02-22 PROCEDURE — 99214 OFFICE O/P EST MOD 30 MIN: CPT | Mod: S$GLB,,, | Performed by: INTERNAL MEDICINE

## 2024-02-22 PROCEDURE — G2211 COMPLEX E/M VISIT ADD ON: HCPCS | Mod: S$GLB,,, | Performed by: INTERNAL MEDICINE

## 2024-02-22 PROCEDURE — 99999 PR PBB SHADOW E&M-EST. PATIENT-LVL III: CPT | Mod: PBBFAC,,, | Performed by: INTERNAL MEDICINE

## 2024-02-22 RX ORDER — ASPIRIN 81 MG/1
81 TABLET ORAL DAILY
COMMUNITY

## 2024-02-22 RX ORDER — ATORVASTATIN CALCIUM 80 MG/1
80 TABLET, FILM COATED ORAL DAILY
COMMUNITY

## 2024-02-22 NOTE — PROGRESS NOTES
ENDOCRINOLOGY   02/22/2024    The patient's last visit with me was on 8/10/2023.     CC:  Follow up s/p parathyroid surgery, thyroid nodules    HPI:  Iliana Joy is a 69 y.o. female with hx of hypertension, hyperlipidemia, osteoporosis, obesity, vitD deficiency and hyperparathyroidism s/p surgery.  Patient is a retired nurse and excellent historian      With regards to the hypercalcemia :    Was found to have hypercalcemia on routine labs - first noted: 2019 on routine labs (at that time PTH normal)   Pth was checked:  high normal several times in the setting of high Ca and slightly low vitD or normal vitD    Interval hx:  Since her last visit she underwent parathyroidectomy with Dr. Rosen on 01/09/2024 at which time the right inferior and left inferior parathyroid glands were removed with appropriate decrease in PTH level (228--> 23).  Since surgery calcium levels have normalized.      She remains on over-the-counter vitamin-D 2000 IU daily with plans to recheck levels in 6 months.    Ca supplementation stopped a month ago at 2 wk post-op visit.     Denies perioral numbness/tingling or muscle cramps    Recent labs with primary care provider:  1/26/24  PTH 45  TSH 0.399 (0.45-4.5) slightly low  fT4 1.36 (0.82-1.77)  vitD 34.1  Ca 9.3  Albumin 4.1      Lab Results   Component Value Date    CALCIUM 9.2 01/19/2024    ALBUMIN 3.6 01/19/2024    ESTGFRAFRICA >60 06/19/2021    EGFRNONAA >60 06/19/2021    PHOS 3.7 01/19/2024    ALKPHOS 65 12/01/2023    [id number] 27 (L) 08/17/2023    .7 (H) 08/17/2023      She denied current or past lithium use  Denies HCTZ use.      Daily intake of calcium is: none (stopped in 2019)  Taking vitamin D: Vitamin-D 2000 IU daily     No polyuria/polydipsia    Denies kidney stones    Regarding OSTEOPOROSIS:  History of radial fracture in 2019 (Dexa with hip Tscore -2.6 at that time)  after which she was started on Fosamax.  Stopped fosamax in 1/202024 by primary care provider (completed  5 years)    Last bmd was:     DEXA 10/14/2022 ([facility])  L-spine T-score -0.7   Right hip T-score-2.4   Left hip T-score -2.2    Regarding thyroid nodules:    FNA right mid and right lower pole nodules with benign biopsies 12/13/23, with plans to repeat Neck ultrasound in 1 year.        Review of patient's allergies indicates:  No Known Allergies      Current Outpatient Medications:     acetaminophen (TYLENOL) 500 MG tablet, Take 1,000 mg by mouth daily as needed., Disp: , Rfl:     ascorbic acid, vitamin C, (VITAMIN C) 100 MG tablet, Take 100 mg by mouth., Disp: , Rfl:     lisinopriL 10 MG tablet, Take 40 mg by mouth every morning., Disp: , Rfl:     multivit-min/ferrous fumarate (MULTI VITAMIN ORAL), Take by mouth Daily., Disp: , Rfl:     vitamin D (VITAMIN D3) 1000 units Tab, Take 2,000 Units by mouth once daily., Disp: , Rfl:       ROS: see HPI       PHYSICAL EXAM  LMP 01/01/2008   Wt Readings from Last 3 Encounters:   01/09/24 83.6 kg (184 lb 6.4 oz)   12/13/23 84.8 kg (187 lb)   09/14/23 85.5 kg (188 lb 6.1 oz)   ]    Constitutional:  Pleasant,  in no acute distress.   HENT:   Head:    Normocephalic and atraumatic.   Eyes:     No scleral icterus.   Neck:    + thyromegaly R>L, no cervical LAD      Outside records reviewed:  History hypercalcemia with high normal parathyroid hormone on multiple occasions and significantly elevated 24 hour urine CT calcium meeting surgical criteria for history of osteoporosis    Her previous documentation she has been referred to ENT with negative sestamibi scan (06/07/2023).        Labs reviewed:  05/10/2023   Calcium 10.9   Albumin 4.1   PTH 69 (16-77)  Alkaline phosphatase 61 (normal   Twenty-five hydroxy vitamin-D 31 (was on ergo 50K weekly for 3 month(s) prior.  Before this vitD was 27 on over the counter vitamin D 5K IU daily)  24 hour urine creatinine 60 (normal with 3 L collection)  24H urine Ca 411 (high)      IMAGING STUDIES    ASSESSMENT/PLAN    Problem List Items Addressed  This Visit       Abnormal thyroid blood test     Recent labs with primary care provider show slightly low TSH and normal fT4.  Primary care provider will be repeating labs in a couple month(s).  She is clinically euthyroid.  If TSH is persistently low or <0.1 will need further evaluation as she may have a hot nodule vs. Grave's.           Osteoporosis (Chronic)     Has completed 5 years of fosamax and started drug holiday 1/2024.  Discussed that resolution of hyperparathyroidism can also help improve bone health.  She will be due for repeat dexa at [facility] in 10/2024, will bring results to review at follow up visit.          Primary hyperparathyroidism     S/p 2 gland parathyroidectomy with normalization of PTH and serum Ca.  Has follow up with Dr. Schmitz in 6 month(s) with repeat labs and if they remain normal can have PTH and Ca checked yearly by primary care provider.     Okay to start Ca supplement with the goal of getting 1200 mg/day from diet and supplements given hx of osteoporosis          Right thyroid nodule     Benign biopsies of 2 right sided nodules 12/2023, will repeat neck ultrasound in 12/2024 followed by visit         Vitamin D deficiency (Chronic)     Continue OTC vitD 2000 IU daily          Other Visit Diagnoses       Multiple thyroid nodules    -  Primary    Relevant Orders    US Soft Tissue Head Neck    TSH    T4, Free          RTC in 12/2024 with Dr. Haider or Ne. Needs neck ultrasound ancillary and labs prior     Mela Solomon MD

## 2024-02-22 NOTE — ASSESSMENT & PLAN NOTE
Benign biopsies of 2 right sided nodules 12/2023, will repeat neck ultrasound in 12/2024 followed by visit

## 2024-02-22 NOTE — ASSESSMENT & PLAN NOTE
S/p 2 gland parathyroidectomy with normalization of PTH and serum Ca.  Has follow up with Dr. Schmitz in 6 month(s) with repeat labs and if they remain normal can have PTH and Ca checked yearly by primary care provider.     Okay to start Ca supplement with the goal of getting 1200 mg/day from diet and supplements given hx of osteoporosis

## 2024-02-22 NOTE — ASSESSMENT & PLAN NOTE
Has completed 5 years of fosamax and started drug holiday 1/2024.  Discussed that resolution of hyperparathyroidism can also help improve bone health.  She will be due for repeat dexa at [facility] in 10/2024, will bring results to review at follow up visit.

## 2024-02-22 NOTE — ASSESSMENT & PLAN NOTE
Recent labs with primary care provider show slightly low TSH and normal fT4.  Primary care provider will be repeating labs in a couple month(s).  She is clinically euthyroid.  If TSH is persistently low or <0.1 will need further evaluation as she may have a hot nodule vs. Grave's.

## 2024-02-22 NOTE — PATIENT INSTRUCTIONS
Please have your next bone density scan at [facility] around 10/2024   Please bring a physical copy of your results to your  next visit.      Will recheck your neck ultrasound and thyroid labs in 12/2024 followed by a visit.

## 2024-07-09 ENCOUNTER — LAB VISIT (OUTPATIENT)
Dept: LAB | Facility: HOSPITAL | Age: 69
End: 2024-07-09
Attending: STUDENT IN AN ORGANIZED HEALTH CARE EDUCATION/TRAINING PROGRAM
Payer: MEDICARE

## 2024-07-09 DIAGNOSIS — Z98.890 S/P PARATHYROIDECTOMY: ICD-10-CM

## 2024-07-09 DIAGNOSIS — Z90.89 S/P PARATHYROIDECTOMY: ICD-10-CM

## 2024-07-09 LAB
25(OH)D3+25(OH)D2 SERPL-MCNC: 47 NG/ML (ref 30–96)
ALBUMIN SERPL BCP-MCNC: 3.8 G/DL (ref 3.5–5.2)
ANION GAP SERPL CALC-SCNC: 10 MMOL/L (ref 8–16)
BUN SERPL-MCNC: 24 MG/DL (ref 8–23)
CALCIUM SERPL-MCNC: 9.6 MG/DL (ref 8.7–10.5)
CHLORIDE SERPL-SCNC: 105 MMOL/L (ref 95–110)
CO2 SERPL-SCNC: 24 MMOL/L (ref 23–29)
CREAT SERPL-MCNC: 0.7 MG/DL (ref 0.5–1.4)
EST. GFR  (NO RACE VARIABLE): >60 ML/MIN/1.73 M^2
GLUCOSE SERPL-MCNC: 87 MG/DL (ref 70–110)
PHOSPHATE SERPL-MCNC: 4 MG/DL (ref 2.7–4.5)
POTASSIUM SERPL-SCNC: 4.1 MMOL/L (ref 3.5–5.1)
PTH-INTACT SERPL-MCNC: 25.3 PG/ML (ref 9–77)
SODIUM SERPL-SCNC: 139 MMOL/L (ref 136–145)

## 2024-07-09 PROCEDURE — 80069 RENAL FUNCTION PANEL: CPT | Performed by: STUDENT IN AN ORGANIZED HEALTH CARE EDUCATION/TRAINING PROGRAM

## 2024-07-09 PROCEDURE — 82306 VITAMIN D 25 HYDROXY: CPT | Performed by: STUDENT IN AN ORGANIZED HEALTH CARE EDUCATION/TRAINING PROGRAM

## 2024-07-09 PROCEDURE — 83970 ASSAY OF PARATHORMONE: CPT | Performed by: STUDENT IN AN ORGANIZED HEALTH CARE EDUCATION/TRAINING PROGRAM

## 2024-07-09 PROCEDURE — 36415 COLL VENOUS BLD VENIPUNCTURE: CPT | Mod: PO | Performed by: STUDENT IN AN ORGANIZED HEALTH CARE EDUCATION/TRAINING PROGRAM

## 2024-11-18 ENCOUNTER — HOSPITAL ENCOUNTER (OUTPATIENT)
Dept: RADIOLOGY | Facility: HOSPITAL | Age: 69
Discharge: HOME | End: 2024-11-18
Attending: INTERNAL MEDICINE
Payer: MEDICARE

## 2024-11-18 DIAGNOSIS — E04.2 MULTIPLE THYROID NODULES: ICD-10-CM

## 2024-11-18 PROCEDURE — 76536 US EXAM OF HEAD AND NECK: CPT | Mod: TC

## 2024-11-20 ENCOUNTER — LAB VISIT (OUTPATIENT)
Dept: LAB | Facility: HOSPITAL | Age: 69
End: 2024-11-20
Payer: MEDICARE

## 2024-11-20 DIAGNOSIS — E04.2 MULTIPLE THYROID NODULES: ICD-10-CM

## 2024-11-20 LAB
T4 FREE SERPL-MCNC: 1.07 NG/DL (ref 0.71–1.51)
TSH SERPL DL<=0.005 MIU/L-ACNC: 0.88 UIU/ML (ref 0.4–4)

## 2024-11-20 PROCEDURE — 84439 ASSAY OF FREE THYROXINE: CPT | Performed by: INTERNAL MEDICINE

## 2024-11-20 PROCEDURE — 84443 ASSAY THYROID STIM HORMONE: CPT | Performed by: INTERNAL MEDICINE

## 2024-11-20 PROCEDURE — 36415 COLL VENOUS BLD VENIPUNCTURE: CPT | Mod: PO | Performed by: INTERNAL MEDICINE

## 2025-02-04 ENCOUNTER — OFFICE VISIT (OUTPATIENT)
Dept: ENDOCRINOLOGY | Facility: CLINIC | Age: 70
End: 2025-02-04
Payer: MEDICARE

## 2025-02-04 VITALS
SYSTOLIC BLOOD PRESSURE: 139 MMHG | WEIGHT: 180.56 LBS | BODY MASS INDEX: 28.28 KG/M2 | OXYGEN SATURATION: 99 % | HEART RATE: 83 BPM | DIASTOLIC BLOOD PRESSURE: 83 MMHG

## 2025-02-04 DIAGNOSIS — E04.1 RIGHT THYROID NODULE: ICD-10-CM

## 2025-02-04 DIAGNOSIS — M81.0 AGE-RELATED OSTEOPOROSIS WITHOUT CURRENT PATHOLOGICAL FRACTURE: Chronic | ICD-10-CM

## 2025-02-04 DIAGNOSIS — E21.0 PRIMARY HYPERPARATHYROIDISM: Primary | ICD-10-CM

## 2025-02-04 DIAGNOSIS — E55.9 VITAMIN D DEFICIENCY: Chronic | ICD-10-CM

## 2025-02-04 RX ORDER — ATORVASTATIN CALCIUM 40 MG/1
TABLET, FILM COATED ORAL
COMMUNITY
Start: 2023-11-06

## 2025-02-04 NOTE — PROGRESS NOTES
ENDOCRINOLOGY     CC:    Follow up primary hyperparathyroidism s/p parathyroid surgery, thyroid nodules    HPI:  Iliana Joy is a 69 y.o. female with hx of hypertension, hyperlipidemia, osteoporosis, obesity, vit D deficiency is here for follow-up of primary hyperparathyroidism s/p surgery and thyroid nodules.  Patient is a retired nurse.    Patient was last seen in the clinic by Dr. Solomon on 02/22/2024.      With regards to the primary hyperparathyroidism:    Was found to have hypercalcemia on routine labs - first noted: 2019 on routine labs (at that time PTH normal)   Pth was checked: high normal several times in the setting of high Ca and slightly low vitD or normal vitD    She underwent parathyroidectomy with Dr. Rosen on 01/09/2024 at which time the right inferior and left inferior parathyroid glands were removed with appropriate decrease in PTH level (228--> 23).  Since surgery calcium levels have normalized.      Denies perioral numbness/tingling or muscle cramps      Lab Results   Component Value Date    CALCIUM 9.6 07/09/2024    ALBUMIN 3.8 07/09/2024    ESTGFRAFRICA >60 06/19/2021    EGFRNONAA >60 06/19/2021    PHOS 4.0 07/09/2024    ALKPHOS 65 12/01/2023    [id number] 47 07/09/2024    PTH 25.3 07/09/2024    TSH 0.875 11/20/2024        Recent labs done with PCP:  01/28/2025   PTH 11 L  Calcium 9.0   Albumin 4.4   Phosphorus 3.9   Vitamin-D 39.5   Magnesium 2.2  TSH 0.982, free T4 1.3  A1c is 5.3    She denied current or past lithium use.  Denies HCTZ use.    Daily intake of calcium is: 1000 mg daily.   Taking vitamin D: Vitamin-D 2000 IU daily -> increase to 4000 IU daily last week as recommended by her PCP.  Takes MVI daily.   Takes yogurt/almond milk - 5 days a week.     Denies polyuria/polydipsia  Denies kidney stones      Regarding OSTEOPOROSIS:    History of radial fracture in 2019 (Dxa with hip Tscore -2.6 at that time)  after which she was started on Fosamax. Right humerus Fx in 2006 when she hit  the door. Stopped fosamax in 1/202024 by primary care provider (completed 5 years)    DXA 10/14/2022 ([facility])  L-spine T-score -0.7   Right hip T-score-2.4   Left hip T-score -2.2    DXA 01/03/2025 ([facility])  L-spine T-score -1.8 (-12.8%)  Right hip T-score -2.6 (-3.8%)  Left hip T-score-2.0 (+2.3%)  Left femoral neck T-score -2.0  Right femoral neck T-score -2.2    Denies parental h/o hip Fx  Sister has osteoporosis and had a hip Fx.     No GERD/PUD    Dental visit every 6 months.  Denies smoking or ETOH use  Denies h/o diabetes or RA  Denies XRT    Exercises - 1 hour daily - walks 30 mins in her apartment and does stretching exercises.     Denies any falls in the past year, no new fractures.       Regarding thyroid nodules:    FNA of 2.11 cm hyperechoic right middle lobe thyroid nodule is recommended  FNA of 2.01 cm isoechoic right middle lobe thyroid nodule is recommended    FNA right mid and right lower pole nodules with benign biopsies 12/13/23.      US thyroid 11/2024    COMPARISON:  11/29/2023     FINDINGS:  The thyroid is enlarged, heterogeneous and hypervascular suggesting thyroiditis.     The right lobe of the thyroid measures 6.7 x 2.6 x 2.2 cm.     The left lobe of the thyroid measures 6.2 x 2.5 x 1.9 cm.     The isthmus measures 5.7 mm.     There are multiple thyroid nodules.  The dominant nodule is as follows:     Nodule #: 1  Maximum size: 2.6 x 1.8 x 1.6 cm.  Approximately stable accounting for different measurement technique.  Location:Right; midpole   Composition:Mixed cystic and solid (1)   Echogenicity:isoechoic (1)  Shape:Not taller-than-wide (0)  Margins: Smooth (0)  ACR TI-RADS total points: 2  ACR TI-RADS risk category: TR2 (2 points)     Additional nodules either represent colloid cysts or do not meet criteria for follow-up or biopsy.     Impression:     The following nodules meet criteria for fine needle aspiration: None  The following nodules meet criteria for follow-up: None  Please correlate  clinically if prior biopsies have been performed.  Findings consistent with thyroiditis with thyromegaly.      Thyroid function test:  Normal    Head and neck radiation: Denies    Neck symptoms: Denies dysphagia, dyspnea or voice changes.    Family history of thyroid cancer: Denies    Family history of thyroid disease: Denies      Lab Results   Component Value Date    TSH 0.875 11/20/2024    FREET4 1.07 11/20/2024         ROS: see HPI       PHYSICAL EXAM    Wt Readings from Last 3 Encounters:   02/22/24 83.1 kg (183 lb 3.2 oz)   01/09/24 83.6 kg (184 lb 6.4 oz)   12/13/23 84.8 kg (187 lb)       Constitutional:  Pleasant,  in no acute distress.   HENT:   Head:    Normocephalic and atraumatic.   Eyes:     No scleral icterus.   Neck:              + thyromegaly       Labs as per HPI.    ASSESSMENT/PLAN    1. Primary hyperparathyroidism  Assessment & Plan:  S/p 2 gland parathyroidectomy in 01/2024 with normalization of PTH and serum Ca.     Labs in 01/2025 done by her PCP showed low PTH of 11 with a normal calcium, phosphorus and vitamin-D.  Denies any perioral numbness/tingling or muscle cramps.  Continue her vitamin-D 2000 IU daily and calcium 1200 mg from diet and supplement.  Discussed repeating her labs in 4 weeks.      Orders:  -     PTH, Intact; Future  -     Renal Function Panel; Future; Expected date: 02/04/2025    2. Vitamin D deficiency  Assessment & Plan:  Continue OTC vitD 2000 IU daily      3. Osteoporosis  Assessment & Plan:  Has completed 5 years of fosamax and started drug holiday 1/2024.    DXA scan in 01/2025 showed decrease in her BMD compared to 2022.  S/p parathyroidectomy in 01/2024.  Discussed that resolution of hyperparathyroidism can also help improve bone health.    No new fragility fractures or falls.    Encouraged safe movements and fall precautions  Continue routine dental visits  Instructed on Calcium and vitamin D   Repeat DXA scan in 01/2027.                 4. Right thyroid  nodule  Assessment & Plan:  Benign biopsies of 2 right sided nodules 12/2023.  US thyroid in 11/2024 showed nodules not meeting criteria for biopsy or follow-up.  Denies any compressive neck symptoms.  Clinically and biochemically euthyroid.  Repeat ultrasound in 1 year.             Follow-up after her labs.    Elissa Jacobson MD

## 2025-02-16 NOTE — ASSESSMENT & PLAN NOTE
Benign biopsies of 2 right sided nodules 12/2023.  US thyroid in 11/2024 showed nodules not meeting criteria for biopsy or follow-up.  Denies any compressive neck symptoms.  Clinically and biochemically euthyroid.  Repeat ultrasound in 1 year.

## 2025-02-16 NOTE — ASSESSMENT & PLAN NOTE
S/p 2 gland parathyroidectomy in 01/2024 with normalization of PTH and serum Ca.     Labs in 01/2025 done by her PCP showed low PTH of 11 with a normal calcium, phosphorus and vitamin-D.  Denies any perioral numbness/tingling or muscle cramps.  Continue her vitamin-D 2000 IU daily and calcium 1200 mg from diet and supplement.  Discussed repeating her labs in 4 weeks.

## 2025-02-16 NOTE — ASSESSMENT & PLAN NOTE
Has completed 5 years of fosamax and started drug holiday 1/2024.    DXA scan in 01/2025 showed decrease in her BMD compared to 2022.  S/p parathyroidectomy in 01/2024.  Discussed that resolution of hyperparathyroidism can also help improve bone health.    No new fragility fractures or falls.    Encouraged safe movements and fall precautions  Continue routine dental visits  Instructed on Calcium and vitamin D   Repeat DXA scan in 01/2027.

## 2025-03-05 ENCOUNTER — LAB VISIT (OUTPATIENT)
Dept: LAB | Facility: HOSPITAL | Age: 70
End: 2025-03-05
Attending: INTERNAL MEDICINE
Payer: MEDICARE

## 2025-03-05 DIAGNOSIS — E21.0 PRIMARY HYPERPARATHYROIDISM: ICD-10-CM

## 2025-03-05 LAB
ALBUMIN SERPL BCP-MCNC: 3.9 G/DL (ref 3.5–5.2)
ANION GAP SERPL CALC-SCNC: 10 MMOL/L (ref 8–16)
BUN SERPL-MCNC: 14 MG/DL (ref 8–23)
CALCIUM SERPL-MCNC: 9.3 MG/DL (ref 8.7–10.5)
CHLORIDE SERPL-SCNC: 103 MMOL/L (ref 95–110)
CO2 SERPL-SCNC: 26 MMOL/L (ref 23–29)
CREAT SERPL-MCNC: 0.7 MG/DL (ref 0.5–1.4)
EST. GFR  (NO RACE VARIABLE): >60 ML/MIN/1.73 M^2
GLUCOSE SERPL-MCNC: 87 MG/DL (ref 70–110)
PHOSPHATE SERPL-MCNC: 3.6 MG/DL (ref 2.7–4.5)
POTASSIUM SERPL-SCNC: 4.2 MMOL/L (ref 3.5–5.1)
PTH-INTACT SERPL-MCNC: 19.4 PG/ML (ref 9–77)
SODIUM SERPL-SCNC: 139 MMOL/L (ref 136–145)

## 2025-03-05 PROCEDURE — 36415 COLL VENOUS BLD VENIPUNCTURE: CPT | Mod: PO | Performed by: INTERNAL MEDICINE

## 2025-03-05 PROCEDURE — 83970 ASSAY OF PARATHORMONE: CPT | Performed by: INTERNAL MEDICINE

## 2025-03-05 PROCEDURE — 80069 RENAL FUNCTION PANEL: CPT | Performed by: INTERNAL MEDICINE

## 2025-04-01 ENCOUNTER — PATIENT MESSAGE (OUTPATIENT)
Dept: ENDOCRINOLOGY | Facility: CLINIC | Age: 70
End: 2025-04-01
Payer: MEDICARE

## 2025-04-01 DIAGNOSIS — E04.1 RIGHT THYROID NODULE: ICD-10-CM

## 2025-04-01 DIAGNOSIS — E55.9 VITAMIN D DEFICIENCY: Primary | Chronic | ICD-10-CM

## (undated) DEVICE — DRAPE STERI INSTRUMENT 1018

## (undated) DEVICE — SEE MEDLINE ITEM 156952

## (undated) DEVICE — GLOVE SURGICAL LATEX SZ 7

## (undated) DEVICE — SUT V-LOC 90 UD GS22 2-0 9IN

## (undated) DEVICE — SUT MCRYL PLUS 4-0 PS2 27IN

## (undated) DEVICE — MARKER SKIN STND TIP BLUE BARR

## (undated) DEVICE — HEMOSTAT SURGICEL FIBRLR 1X2IN

## (undated) DEVICE — ELECTRODE REM PLYHSV RETURN 9

## (undated) DEVICE — DRESSING AQUACEL SACRAL 9 X 9

## (undated) DEVICE — PAD CURAD NONADH 3X4IN

## (undated) DEVICE — DRAPE ARM DAVINCI XI

## (undated) DEVICE — DISSECTOR LIGASURE EXACT 21CM

## (undated) DEVICE — DISCONTINUED HS CLEANUP

## (undated) DEVICE — MANIFOLD 4 PORT

## (undated) DEVICE — DRAPE CORETEMP FLD WRM 56X62IN

## (undated) DEVICE — CONTAINER SPECIMEN OR STER 4OZ

## (undated) DEVICE — ADHESIVE DERMABOND ADVANCED

## (undated) DEVICE — SUT 4-0 12-18IN SILK BLACK

## (undated) DEVICE — SUT VICRYL 6-0 P1 18IN UD

## (undated) DEVICE — CORD BIPOLAR 12 FOOT

## (undated) DEVICE — TRAY MINOR GEN SURG OMC

## (undated) DEVICE — DRESSING TRANS 4X4 TEGADERM

## (undated) DEVICE — SEAL UNIVERSAL 5MM-8MM XI

## (undated) DEVICE — OBTURATOR BLADELESS 8MM XI CLR

## (undated) DEVICE — PROBE SIMULATOR KRAFF

## (undated) DEVICE — NDL 22GA X1 1/2 REG BEVEL

## (undated) DEVICE — DRAPE INSTR MAGNETIC 10X16IN

## (undated) DEVICE — SUT VICRYL 3-0 27 SH

## (undated) DEVICE — DRAPE HALF SURGICAL 40X58IN

## (undated) DEVICE — CHLORAPREP 10.5 ML APPLICATOR

## (undated) DEVICE — ELECTRODE BLADE INSULATED 1 IN

## (undated) DEVICE — TRAY FOLEY 16FR INFECTION CONT

## (undated) DEVICE — SUT LIGACLIP SMALL XTRA

## (undated) DEVICE — ELECTRODE EMG NEUROVISION

## (undated) DEVICE — DRAPE EENT SPLIT STERILE

## (undated) DEVICE — GAUZE SPONGE PEANUT STRL

## (undated) DEVICE — NDL SYR 3CC HYPO 25GX5/8 LL

## (undated) DEVICE — NDL HYPO REG 25G X 1 1/2

## (undated) DEVICE — KIT WING PAD POSITIONING

## (undated) DEVICE — SUT 3-0 12-18IN SILK

## (undated) DEVICE — COVER TIP CURVED SCISSORS XI